# Patient Record
Sex: MALE | Race: WHITE | Employment: OTHER | URBAN - METROPOLITAN AREA
[De-identification: names, ages, dates, MRNs, and addresses within clinical notes are randomized per-mention and may not be internally consistent; named-entity substitution may affect disease eponyms.]

---

## 2017-04-19 PROBLEM — M19.011 GLENOHUMERAL ARTHRITIS, RIGHT: Status: ACTIVE | Noted: 2017-04-19

## 2022-12-05 ENCOUNTER — EXTERNAL FACILITY (OUTPATIENT)
Dept: INTERNAL MEDICINE CLINIC | Facility: CLINIC | Age: 87
End: 2022-12-05

## 2022-12-05 DIAGNOSIS — I10 HYPERTENSION, UNSPECIFIED TYPE: ICD-10-CM

## 2022-12-05 DIAGNOSIS — I35.0 AORTIC VALVE STENOSIS, ETIOLOGY OF CARDIAC VALVE DISEASE UNSPECIFIED: ICD-10-CM

## 2022-12-05 DIAGNOSIS — R53.1 WEAKNESS: Primary | ICD-10-CM

## 2022-12-05 DIAGNOSIS — R26.81 GAIT INSTABILITY: ICD-10-CM

## 2022-12-05 DIAGNOSIS — R62.7 FAILURE TO THRIVE IN ADULT: ICD-10-CM

## 2022-12-05 DIAGNOSIS — K51.919 ULCERATIVE COLITIS WITH COMPLICATION, UNSPECIFIED LOCATION (HCC): ICD-10-CM

## 2022-12-05 DIAGNOSIS — Z86.73 HISTORY OF TIA (TRANSIENT ISCHEMIC ATTACK): ICD-10-CM

## 2022-12-06 ENCOUNTER — EXTERNAL FACILITY (OUTPATIENT)
Dept: INTERNAL MEDICINE CLINIC | Facility: CLINIC | Age: 87
End: 2022-12-06

## 2022-12-06 DIAGNOSIS — Z86.73 HISTORY OF TIA (TRANSIENT ISCHEMIC ATTACK): ICD-10-CM

## 2022-12-06 DIAGNOSIS — R53.1 WEAKNESS: Primary | ICD-10-CM

## 2022-12-06 DIAGNOSIS — I35.0 AORTIC VALVE STENOSIS, ETIOLOGY OF CARDIAC VALVE DISEASE UNSPECIFIED: ICD-10-CM

## 2022-12-06 DIAGNOSIS — K51.919 ULCERATIVE COLITIS WITH COMPLICATION, UNSPECIFIED LOCATION (HCC): ICD-10-CM

## 2022-12-06 DIAGNOSIS — R62.7 FAILURE TO THRIVE IN ADULT: ICD-10-CM

## 2022-12-06 PROCEDURE — 99309 SBSQ NF CARE MODERATE MDM 30: CPT | Performed by: INTERNAL MEDICINE

## 2022-12-08 ENCOUNTER — EXTERNAL FACILITY (OUTPATIENT)
Dept: INTERNAL MEDICINE CLINIC | Facility: CLINIC | Age: 87
End: 2022-12-08

## 2022-12-08 DIAGNOSIS — Z86.73 HISTORY OF TIA (TRANSIENT ISCHEMIC ATTACK): ICD-10-CM

## 2022-12-08 DIAGNOSIS — I35.0 AORTIC VALVE STENOSIS, ETIOLOGY OF CARDIAC VALVE DISEASE UNSPECIFIED: ICD-10-CM

## 2022-12-08 DIAGNOSIS — R62.7 FAILURE TO THRIVE IN ADULT: ICD-10-CM

## 2022-12-08 DIAGNOSIS — R53.1 WEAKNESS: Primary | ICD-10-CM

## 2022-12-08 DIAGNOSIS — K51.919 ULCERATIVE COLITIS WITH COMPLICATION, UNSPECIFIED LOCATION (HCC): ICD-10-CM

## 2022-12-09 NOTE — PROGRESS NOTES
[facility] care note transcribed by Dr. Cullen Ramires    Date seen: 12/6/2022    Subjective: Patient seen and examined for gait instability osteoarthritis weakness, colitis. Patient seems to be doing well, he has been working with physical therapy, seems to be improving, lab work reviewed. Patient seems to be moving his bowels. Getting along well with staff participating with the physical therapist. He is tolerating medications. Patient and has asked if I have communicated with his daughter, she is following closely to his healthcare. However she [place] of [place]. PHYSICAL EXAMINATION: Vital signs: See chart  Gen. exam: Alert and awake, mental status at baseline, in no acute distress  HEENT: Pupils equal and reactive to light and accommodation, moist mucous membranes  Neck exam: Supple with baseline range of motion.  Normal thyroid trachea midline, no JVD  Heart exam: Regular rate and rhythm no murmurs no S3 no S4  Lung exam: No rales no rhonchi no wheezes  Abdominal exam: Soft nontender, nondistended positive bowel sounds are normoactive  Extremities exam: no clubbing no cyanosis no edema  Skin exam: see wound notes for details, no rashes  Neurological exam: Cranial nerves II through XII intact, no gross deficits  Musculoskeletal exam: Moderate bilateral generalized hand arthritis appreciated, no obvious deformity    Labs/imaging: See chart    DVT prophylaxis: Early ambulation, anticoagulation contraindicated with fall precautions    Ambulatory status: Per hospital discharge recommendations, specialist involvement/recommendations and physical therapy evaluation    Assessment and plan: We have a 80year-old male with history of Aortic Stenosis s/p TAVR (2015), TIA, Ulcerative Colitis, HLD, OA Knee whom presents with weakness, failure to thrive at home  Weakness: Stable continue current monitoring and management, physical therapy, occupational therapy, physiatry to evaluate and treat, further orders pending clinical course  Failure to Thrive: Stable continue current monitoring and management  Aortic Stenosis s/p TAVR: Stable continue current monitoring and management  Hx of TIA: Stable continue current monitoring and management  Ulcerative Colitis: Stable continue current monitoring and management    Stable problem list:  HTN: Stable continue current monitoring and management  Gait instability: Anticoagulation contraindicated, okay to continue Plavix    CODE STATUS: Full

## 2022-12-09 NOTE — PROGRESS NOTES
History and physical    [facility] care note transcribed by Dr. Enrique Mendoza date: 12/4/2022  Date seen: 12/5/2022    Chief Complaint: Failure to thrive/weakness    HPI: Patient is a 77-year-old male with history of aortic stenosis, hyperlipidemia, advanced age, who has spent some recent stays in rehab. Patient was readmitted for weakness, decline. Patient underwent work-up, treatment, stabilized, improved, now was transitioned here to [facility] in stable condition for subacute rehab. Patient seen and examined, memory seems to be intact, advanced age, medications reviewed and continued as recommended by the discharging hospital physician. Patient is in agreement with the plan of care, seems to have well-controlled pain, claims his energy levels are still poor. PMH/PSH  Patient Active Problem List  Diagnosis  Aortic stenosis  Hyperlipidemia  Preventative health care  BPH (benign prostatic hyperplasia)  Ulcerative colitis  S/P hip replacement  Lichen sclerosus  DJD (degenerative joint disease) of knee  Coronary atherosclerosis of native coronary artery  S/P AVR (aortic valve replacement) 3/3/15  Hemispheric carotid artery syndrome  TIA (transient ischemic attack)  Schatzki's ring  Dysphagia    Last recorded Outpatient Medications  Medication  acetaminophen 650 MG Tab  betamethasone valerate (VALISONE) 0.1 % ointment  carboxymethylcellulose, REFRESH, 0.5 % OPHT SOLN  Cholecalciferol (VITAMIN D3) 2000 UNIT Cap  clopidogrel (PLAVIX) 75 MG tablet  Docusate Sodium (COLACE PO)  finasteride 5 MG PO tablet  hydrOXYzine pamoate (VISTARIL) 25 MG capsule  lisinopril (PRINIVIL, ZESTRIL) 5 MG tablet  magnesium oxide 400 MG tablet  mesalamine (DELZICOL) 400 MG cap  Multiple Vitamins-Minerals (EYE VITAMINS PO)  Omeprazole 20 MG Tablet Delayed Response  simvastatin (ZOCOR) 40 MG tablet  terazosin (HYTRIN) 5 MG capsule    Allergies and drug reactions:   Allergies  Allergen Reactions  Tegaderm Chg Dressing [Chlorhexidine] Rash    SH:  Social History  Socioeconomic History  Marital status:   Spouse name: Not on file  Number of children: Not on file  Years of education: Not on file  Highest education level: Not on file  Occupational History  Not on file  Tobacco Use  Smoking status: Former Smoker  Packs/day: 2.00  Years: 40.00  Pack years: 80.00  Types: Cigarettes  Quit date: 3/13/1988  Years since quittin.6  Smokeless tobacco: Never Used  Substance and Sexual Activity  Alcohol use: No  Drug use: No  Sexual activity: Not on file  Other Topics Concern  Not on file  Social History Narrative  Not on file    FH:  Family History  Problem Relation Age of Onset  No Known Problems Mother  No Known Problems Father    Current medications: See chart    REVIEW OF SYSTEMS:  Constitutional: Negative for Chills, fatigue, fever, malaise, weight gain and weight loss. ENMT: Negative for Nasal drainage and sinus pressure. Eyes: Negative for Vision changes. Respiratory: Negative for Cough, dyspnea and wheezing. Cardio: Negative Chest pain and irregular heartbeat/palpitations. GI: Negative for Abdominal pain, constipation, diarrhea, heartburn, nausea and vomiting. : Negative for Dysuria and urinary frequency. Endocrine: Negative for Cold intolerance and heat intolerance. Neuro: Negative for Gait disturbance and memory impairment. Psych: Negative for Anxiety and depression. Integumentary: Negative for Hives and rash. MS: Negative muscle weakness. pos for joint pain  Hema/Lymph: Negative Easy bleeding and easy bruising. Allergic/Immuno: Negative Environmental allergies and food allergies. PHYSICAL EXAMINATION: Vital signs: See chart  Gen. exam: Alert and awake, mental status at baseline, in no acute distress  HEENT: Pupils equal and reactive to light and accommodation, moist mucous membranes  Neck exam: Supple with baseline range of motion.  Normal thyroid trachea midline, no JVD  Heart exam: Regular rate and rhythm no murmurs no S3 no S4  Lung exam: No rales no rhonchi no wheezes  Abdominal exam: Soft nontender, nondistended positive bowel sounds are normoactive  Extremities exam: no clubbing no cyanosis no edema  Skin exam: see wound notes for details, no rashes  Neurological exam: Cranial nerves II through XII intact, no gross deficits  Musculoskeletal exam: Moderate bilateral generalized hand arthritis appreciated, no obvious deformity    Labs/imaging: See chart    DVT prophylaxis: Early ambulation, anticoagulation contraindicated with fall precautions    Ambulatory status: Per hospital discharge recommendations, specialist involvement/recommendations and physical therapy evaluation    Assessment and plan: We have a 80year-old male with history of Aortic Stenosis s/p TAVR (2015), TIA, Ulcerative Colitis, HLD, OA Knee whom presents with weakness, failure to thrive at home  Weakness: Stable continue current monitoring and management, physical therapy, occupational therapy, physiatry to evaluate and treat, further orders pending clinical course  Failure to Thrive: Stable continue current monitoring and management  Aortic Stenosis s/p TAVR: Stable continue current monitoring and management  Hx of TIA: Stable continue current monitoring and management  Ulcerative Colitis: Stable continue current monitoring and management  HTN: Stable continue current monitoring and management  Gait instability: Anticoagulation contraindicated, okay to continue Plavix    CODE STATUS: Full  admit condition: Stable

## 2022-12-12 ENCOUNTER — EXTERNAL FACILITY (OUTPATIENT)
Dept: INTERNAL MEDICINE CLINIC | Facility: CLINIC | Age: 87
End: 2022-12-12

## 2022-12-12 DIAGNOSIS — R62.7 FAILURE TO THRIVE IN ADULT: ICD-10-CM

## 2022-12-12 DIAGNOSIS — J44.1 COPD EXACERBATION (HCC): ICD-10-CM

## 2022-12-12 DIAGNOSIS — K51.919 ULCERATIVE COLITIS WITH COMPLICATION, UNSPECIFIED LOCATION (HCC): ICD-10-CM

## 2022-12-12 DIAGNOSIS — I35.0 AORTIC VALVE STENOSIS, ETIOLOGY OF CARDIAC VALVE DISEASE UNSPECIFIED: ICD-10-CM

## 2022-12-12 DIAGNOSIS — Z86.73 HISTORY OF TIA (TRANSIENT ISCHEMIC ATTACK): ICD-10-CM

## 2022-12-12 DIAGNOSIS — R53.1 WEAKNESS: Primary | ICD-10-CM

## 2022-12-15 ENCOUNTER — EXTERNAL FACILITY (OUTPATIENT)
Dept: INTERNAL MEDICINE CLINIC | Facility: CLINIC | Age: 87
End: 2022-12-15

## 2022-12-15 DIAGNOSIS — R53.1 WEAKNESS: Primary | ICD-10-CM

## 2022-12-15 DIAGNOSIS — K51.919 ULCERATIVE COLITIS WITH COMPLICATION, UNSPECIFIED LOCATION (HCC): ICD-10-CM

## 2022-12-15 DIAGNOSIS — R62.7 FAILURE TO THRIVE IN ADULT: ICD-10-CM

## 2022-12-15 DIAGNOSIS — I35.0 AORTIC VALVE STENOSIS, ETIOLOGY OF CARDIAC VALVE DISEASE UNSPECIFIED: ICD-10-CM

## 2022-12-15 DIAGNOSIS — J44.1 COPD EXACERBATION (HCC): ICD-10-CM

## 2022-12-19 ENCOUNTER — EXTERNAL FACILITY (OUTPATIENT)
Dept: INTERNAL MEDICINE CLINIC | Facility: CLINIC | Age: 87
End: 2022-12-19

## 2022-12-19 DIAGNOSIS — J44.1 COPD EXACERBATION (HCC): ICD-10-CM

## 2022-12-19 DIAGNOSIS — R53.1 WEAKNESS: Primary | ICD-10-CM

## 2022-12-19 DIAGNOSIS — K51.919 ULCERATIVE COLITIS WITH COMPLICATION, UNSPECIFIED LOCATION (HCC): ICD-10-CM

## 2022-12-19 DIAGNOSIS — I35.0 AORTIC VALVE STENOSIS, ETIOLOGY OF CARDIAC VALVE DISEASE UNSPECIFIED: ICD-10-CM

## 2022-12-19 DIAGNOSIS — R62.7 FAILURE TO THRIVE IN ADULT: ICD-10-CM

## 2022-12-22 ENCOUNTER — EXTERNAL FACILITY (OUTPATIENT)
Dept: INTERNAL MEDICINE CLINIC | Facility: CLINIC | Age: 87
End: 2022-12-22

## 2022-12-22 DIAGNOSIS — J44.1 COPD EXACERBATION (HCC): ICD-10-CM

## 2022-12-22 DIAGNOSIS — R62.7 FAILURE TO THRIVE IN ADULT: ICD-10-CM

## 2022-12-22 DIAGNOSIS — R53.1 WEAKNESS: Primary | ICD-10-CM

## 2022-12-22 DIAGNOSIS — K51.919 ULCERATIVE COLITIS WITH COMPLICATION, UNSPECIFIED LOCATION (HCC): ICD-10-CM

## 2022-12-22 DIAGNOSIS — I35.0 AORTIC VALVE STENOSIS, ETIOLOGY OF CARDIAC VALVE DISEASE UNSPECIFIED: ICD-10-CM

## 2022-12-23 NOTE — PROGRESS NOTES
[facility] care note transcribed by Dr. Vinnie Lr    Date seen: 12/22/2022    Subjective: Patient seen and examined for gait instability osteoarthritis weakness, colitis, constipation, COPD exacerbation. Patient seems stable, continues to be bowel focused, this consumes most of our conversation, but he is congestion and cough have resolved, he is working with physical therapy, is making some discharge plans. Pleasant as usual. Wishes me uneventful holiday weekend. His hair has been cut, he looks well groomed and cared for. PHYSICAL EXAMINATION: Vital signs: See chart  Gen. exam: Alert and awake, mental status at baseline, in no acute distress  HEENT: Pupils equal and reactive to light and accommodation, moist mucous membranes  Neck exam: Supple with baseline range of motion.  Normal thyroid trachea midline, no JVD  Heart exam: Regular rate and rhythm no murmurs no S3 no S4  Lung exam: No rales no rhonchi no wheezes, resolved coarse cough  Abdominal exam: Soft nontender, nondistended positive bowel sounds are normoactive  Extremities exam: no clubbing no cyanosis no edema  Skin exam: see wound notes for details, no rashes  Neurological exam: Cranial nerves II through XII intact, no gross deficits  Musculoskeletal exam: Moderate bilateral generalized hand arthritis appreciated, no obvious deformity    Labs/imaging: See chart    DVT prophylaxis: Early ambulation, anticoagulation contraindicated with fall precautions    Ambulatory status: Per hospital discharge recommendations, specialist involvement/recommendations and physical therapy evaluation    Assessment and plan: We have a 80year-old male with history of Aortic Stenosis s/p TAVR (2015), TIA, Ulcerative Colitis, HLD, OA Knee whom presents with weakness, failure to thrive at home  Weakness: Stable continue current monitoring and management, physical therapy, occupational therapy, physiatry to evaluate and treat, further orders pending clinical course, discharge planning  Failure to Thrive: Stable continue current monitoring and management  Aortic Stenosis s/p TAVR: Stable continue current monitoring and management  Ulcerative Colitis: Twice daily MiraLAX, and stool softeners for now of Colace, continue current ulcerative colitis medication as well  COPD exacerbation: Duo nebs can be now changed to as needed basis 3 times daily, pulmonology following, serial chest x-rays can stop, pulmonology may sign off    Stable problem list:  Hx of TIA: Stable continue current monitoring and management  HTN: Stable continue current monitoring and management  Gait instability: Anticoagulation contraindicated, okay to continue Plavix    CODE STATUS: Full

## 2022-12-23 NOTE — PROGRESS NOTES
[facility] care note transcribed by Dr. Sadie Thompson    Date seen: 12/19/2022    Subjective: Patient seen and examined for gait instability osteoarthritis weakness, colitis, constipation, COPD exacerbation. Patient seems stable, doing quite well, his cough and congestion have completely resolved, pulmonology is following, and is pleased with his course, he continues to improve with physical therapy, bowels have still been constipated and loose at times. More constipated than anything, he still has complaints of this. PHYSICAL EXAMINATION: Vital signs: See chart  Gen. exam: Alert and awake, mental status at baseline, in no acute distress  HEENT: Pupils equal and reactive to light and accommodation, moist mucous membranes  Neck exam: Supple with baseline range of motion.  Normal thyroid trachea midline, no JVD  Heart exam: Regular rate and rhythm no murmurs no S3 no S4  Lung exam: No rales no rhonchi no wheezes, coarse cough  Abdominal exam: Soft nontender, nondistended positive bowel sounds are normoactive  Extremities exam: no clubbing no cyanosis no edema  Skin exam: see wound notes for details, no rashes  Neurological exam: Cranial nerves II through XII intact, no gross deficits  Musculoskeletal exam: Moderate bilateral generalized hand arthritis appreciated, no obvious deformity    Labs/imaging: See chart    DVT prophylaxis: Early ambulation, anticoagulation contraindicated with fall precautions    Ambulatory status: Per hospital discharge recommendations, specialist involvement/recommendations and physical therapy evaluation    Assessment and plan: We have a 80year-old male with history of Aortic Stenosis s/p TAVR (2015), TIA, Ulcerative Colitis, HLD, OA Knee whom presents with weakness, failure to thrive at home  Weakness: Stable continue current monitoring and management, physical therapy, occupational therapy, physiatry to evaluate and treat, further orders pending clinical course  Failure to Thrive: Stable continue current monitoring and management  Aortic Stenosis s/p TAVR: Stable continue current monitoring and management  Ulcerative Colitis: Twice daily MiraLAX, and stool softeners for now of Colace, continue current ulcerative colitis medication as well  COPD exacerbation: Duo nebs can be now changed to as needed basis 3 times daily, pulmonology following, serial chest x-rays can stop    Stable problem list:  Hx of TIA: Stable continue current monitoring and management  HTN: Stable continue current monitoring and management  Gait instability: Anticoagulation contraindicated, okay to continue Plavix    CODE STATUS: Full

## 2022-12-23 NOTE — PROGRESS NOTES
[facility] care note transcribed by Dr. Toy Morales    Date seen: 12/15/2022    Subjective: Patient seen and examined for gait instability osteoarthritis weakness, colitis, constipation. Patient seen examined, stable and doing well, coarse cough and breathing sound better, pulmonology following. Patient is working with physical therapy, seems to be getting stronger, still seems to be constipated. His history of colitis is treated. PHYSICAL EXAMINATION: Vital signs: See chart  Gen. exam: Alert and awake, mental status at baseline, in no acute distress  HEENT: Pupils equal and reactive to light and accommodation, moist mucous membranes  Neck exam: Supple with baseline range of motion.  Normal thyroid trachea midline, no JVD  Heart exam: Regular rate and rhythm no murmurs no S3 no S4  Lung exam: No rales no rhonchi no wheezes, coarse cough  Abdominal exam: Soft nontender, nondistended positive bowel sounds are normoactive  Extremities exam: no clubbing no cyanosis no edema  Skin exam: see wound notes for details, no rashes  Neurological exam: Cranial nerves II through XII intact, no gross deficits  Musculoskeletal exam: Moderate bilateral generalized hand arthritis appreciated, no obvious deformity    Labs/imaging: See chart    DVT prophylaxis: Early ambulation, anticoagulation contraindicated with fall precautions    Ambulatory status: Per hospital discharge recommendations, specialist involvement/recommendations and physical therapy evaluation    Assessment and plan: We have a 80year-old male with history of Aortic Stenosis s/p TAVR (2015), TIA, Ulcerative Colitis, HLD, OA Knee whom presents with weakness, failure to thrive at home  Weakness: Stable continue current monitoring and management, physical therapy, occupational therapy, physiatry to evaluate and treat, further orders pending clinical course  Failure to Thrive: Stable continue current monitoring and management  Aortic Stenosis s/p TAVR: Stable continue current monitoring and management  Ulcerative Colitis: Twice daily MiraLAX, and stool softeners for now of Colace, continue current ulcerative colitis medication as well  COPD exacerbation: Continue with duo nebs, other treatments per pulmonology, serial chest x-ray    Stable problem list:  Hx of TIA: Stable continue current monitoring and management  HTN: Stable continue current monitoring and management  Gait instability: Anticoagulation contraindicated, okay to continue Plavix    CODE STATUS: Full

## 2022-12-23 NOTE — PROGRESS NOTES
[facility] the [facility] care note transcribed by Dr. Jordi Grove    Date seen: 12/12/2022    Subjective: Patient seen and examined for gait instability osteoarthritis weakness, colitis, constipation. Patient seems stable, doing well, but still having trouble with constipation, he is asking about going up on the stool softeners. Also seems to have a coarse congested type cough, that is recently started, we have involved the pulmonologist, he does have a history of COPD. PHYSICAL EXAMINATION: Vital signs: See chart  Gen. exam: Alert and awake, mental status at baseline, in no acute distress  HEENT: Pupils equal and reactive to light and accommodation, moist mucous membranes  Neck exam: Supple with baseline range of motion.  Normal thyroid trachea midline, no JVD  Heart exam: Regular rate and rhythm no murmurs no S3 no S4  Lung exam: No rales no rhonchi no wheezes, coarse cough  Abdominal exam: Soft nontender, nondistended positive bowel sounds are normoactive  Extremities exam: no clubbing no cyanosis no edema  Skin exam: see wound notes for details, no rashes  Neurological exam: Cranial nerves II through XII intact, no gross deficits  Musculoskeletal exam: Moderate bilateral generalized hand arthritis appreciated, no obvious deformity    Labs/imaging: See chart    DVT prophylaxis: Early ambulation, anticoagulation contraindicated with fall precautions    Ambulatory status: Per hospital discharge recommendations, specialist involvement/recommendations and physical therapy evaluation    Assessment and plan: We have a 80year-old male with history of Aortic Stenosis s/p TAVR (2015), TIA, Ulcerative Colitis, HLD, OA Knee whom presents with weakness, failure to thrive at home  Weakness: Stable continue current monitoring and management, physical therapy, occupational therapy, physiatry to evaluate and treat, further orders pending clinical course  Failure to Thrive: Stable continue current monitoring and management  Aortic Stenosis s/p TAVR: Stable continue current monitoring and management  Ulcerative Colitis: bowels have been up-and-down, we will add on MiraLAX as a softener on account of his constipation  COPD exacerbation: With congestion, will start duo nebs 3 times daily for 7 to 10 days, and ask pulmonology to see if    Stable problem list:  Hx of TIA: Stable continue current monitoring and management  HTN: Stable continue current monitoring and management  Gait instability: Anticoagulation contraindicated, okay to continue Plavix    CODE STATUS: Full

## 2022-12-23 NOTE — PROGRESS NOTES
[facility] note transcribed by Dr. Buck Ward    Date seen: 12/8/2022    Subjective: Patient seen and examined for gait instability osteoarthritis weakness, colitis. Patient seems to be doing well, patient seems stable, working physical therapy, but does still have some complaints about his bowels, his colitis does seem to be bothering him, and he seems to be constipated on most days, he is using assistance to get to the bathroom, but is doing quite well. PHYSICAL EXAMINATION: Vital signs: See chart  Gen. exam: Alert and awake, mental status at baseline, in no acute distress  HEENT: Pupils equal and reactive to light and accommodation, moist mucous membranes  Neck exam: Supple with baseline range of motion.  Normal thyroid trachea midline, no JVD  Heart exam: Regular rate and rhythm no murmurs no S3 no S4  Lung exam: No rales no rhonchi no wheezes  Abdominal exam: Soft nontender, nondistended positive bowel sounds are normoactive  Extremities exam: no clubbing no cyanosis no edema  Skin exam: see wound notes for details, no rashes  Neurological exam: Cranial nerves II through XII intact, no gross deficits  Musculoskeletal exam: Moderate bilateral generalized hand arthritis appreciated, no obvious deformity    Labs/imaging: See chart    DVT prophylaxis: Early ambulation, anticoagulation contraindicated with fall precautions    Ambulatory status: Per hospital discharge recommendations, specialist involvement/recommendations and physical therapy evaluation    Assessment and plan: We have a 80year-old male with history of Aortic Stenosis s/p TAVR (2015), TIA, Ulcerative Colitis, HLD, OA Knee whom presents with weakness, failure to thrive at home  Weakness: Stable continue current monitoring and management, physical therapy, occupational therapy, physiatry to evaluate and treat, further orders pending clinical course  Failure to Thrive: Stable continue current monitoring and management  Aortic Stenosis s/p TAVR: Stable continue current monitoring and management  Hx of TIA: Stable continue current monitoring and management  Ulcerative Colitis: Able, bowels have been up-and-down, we will add on MiraLAX as a softener on account of his constipation    Stable problem list:  HTN: Stable continue current monitoring and management  Gait instability: Anticoagulation contraindicated, okay to continue Plavix    CODE STATUS: Full

## 2022-12-29 ENCOUNTER — EXTERNAL FACILITY (OUTPATIENT)
Dept: INTERNAL MEDICINE CLINIC | Facility: CLINIC | Age: 87
End: 2022-12-29

## 2022-12-29 DIAGNOSIS — J44.1 COPD EXACERBATION (HCC): ICD-10-CM

## 2022-12-29 DIAGNOSIS — R53.1 WEAKNESS: Primary | ICD-10-CM

## 2022-12-29 DIAGNOSIS — K51.919 ULCERATIVE COLITIS WITH COMPLICATION, UNSPECIFIED LOCATION (HCC): ICD-10-CM

## 2022-12-29 DIAGNOSIS — R62.7 FAILURE TO THRIVE IN ADULT: ICD-10-CM

## 2022-12-29 DIAGNOSIS — I35.0 AORTIC VALVE STENOSIS, ETIOLOGY OF CARDIAC VALVE DISEASE UNSPECIFIED: ICD-10-CM

## 2022-12-29 PROCEDURE — 99309 SBSQ NF CARE MODERATE MDM 30: CPT | Performed by: INTERNAL MEDICINE

## 2023-02-09 NOTE — PROGRESS NOTES
[facility] care note transcribed by Dr. Daniel Lock    Date seen: 12/29/2022    Subjective: Patient seen and examined for gait instability osteoarthritis weakness, colitis, constipation, COPD exacerbation. Patient seen examined, stable and doing well, he is planning on discharging home in the near future, but he does not know a date. He does have good family support but advanced age, medications have been stable, breathing has been very stable. Bowels have been very stable, lab work reviewed uneventful Christmas holiday. PHYSICAL EXAMINATION: Vital signs: See chart  Gen. exam: Alert and awake, mental status at baseline, in no acute distress  HEENT: Pupils equal and reactive to light and accommodation, moist mucous membranes  Neck exam: Supple with baseline range of motion.  Normal thyroid trachea midline, no JVD  Heart exam: Regular rate and rhythm no murmurs no S3 no S4  Lung exam: No rales no rhonchi no wheezes, resolved coarse cough  Abdominal exam: Soft nontender, nondistended positive bowel sounds are normoactive  Extremities exam: no clubbing no cyanosis no edema  Skin exam: see wound notes for details, no rashes  Neurological exam: Cranial nerves II through XII intact, no gross deficits  Musculoskeletal exam: Moderate bilateral generalized hand arthritis appreciated, no obvious deformity    Labs/imaging: See chart    DVT prophylaxis: Early ambulation, anticoagulation contraindicated with fall precautions    Ambulatory status: Per hospital discharge recommendations, specialist involvement/recommendations and physical therapy evaluation    Assessment and plan: We have a 80year-old male with history of Aortic Stenosis s/p TAVR (2015), TIA, Ulcerative Colitis, HLD, OA Knee whom presents with weakness, failure to thrive at home  Weakness: Stable continue current monitoring and management, physical therapy, occupational therapy, physiatry to evaluate and treat, further orders pending clinical course, discharge planning for the near future  Failure to Thrive: Stable continue current monitoring and management  Aortic Stenosis s/p TAVR: Stable continue current monitoring and management  Ulcerative Colitis: Twice daily MiraLAX, and stool softeners for now of Colace, continue current ulcerative colitis medication as well  COPD exacerbation: Duo nebs can be now changed to as needed basis 3 times daily, pulmonology following, serial chest x-rays can stop, pulmonology may sign off    Stable problem list:  Hx of TIA: Stable continue current monitoring and management  HTN: Stable continue current monitoring and management  Gait instability: Anticoagulation contraindicated, okay to continue Plavix    CODE STATUS: Full

## 2023-02-16 ENCOUNTER — HOSPITAL ENCOUNTER (INPATIENT)
Facility: HOSPITAL | Age: 88
LOS: 3 days | Discharge: NURSING, REHAB OR LONG-TERM CARE | DRG: 690 | End: 2023-02-20
Attending: EMERGENCY MEDICINE | Admitting: INTERNAL MEDICINE
Payer: MEDICARE

## 2023-02-16 ENCOUNTER — HOSPITAL ENCOUNTER (INPATIENT)
Facility: HOSPITAL | Age: 88
LOS: 3 days | Discharge: NURSING, REHAB OR LONG-TERM CARE | End: 2023-02-20
Attending: EMERGENCY MEDICINE | Admitting: INTERNAL MEDICINE
Payer: MEDICARE

## 2023-02-16 DIAGNOSIS — R11.2 NAUSEA AND VOMITING, UNSPECIFIED VOMITING TYPE: ICD-10-CM

## 2023-02-16 DIAGNOSIS — N39.0 URINARY TRACT INFECTION WITHOUT HEMATURIA, SITE UNSPECIFIED: Primary | ICD-10-CM

## 2023-02-16 LAB
ANION GAP SERPL CALC-SCNC: 6 MMOL/L (ref 0–18)
BASOPHILS # BLD AUTO: 0.01 X10(3) UL (ref 0–0.2)
BASOPHILS NFR BLD AUTO: 0.1 %
BILIRUB UR QL: NEGATIVE
BUN BLD-MCNC: 15 MG/DL (ref 7–18)
BUN/CREAT SERPL: 16.5 (ref 10–20)
CALCIUM BLD-MCNC: 9 MG/DL (ref 8.5–10.1)
CHLORIDE SERPL-SCNC: 109 MMOL/L (ref 98–112)
CLARITY UR: CLEAR
CO2 SERPL-SCNC: 24 MMOL/L (ref 21–32)
COLOR UR: YELLOW
CREAT BLD-MCNC: 0.91 MG/DL
DEPRECATED RDW RBC AUTO: 49.6 FL (ref 35.1–46.3)
EOSINOPHIL # BLD AUTO: 0.07 X10(3) UL (ref 0–0.7)
EOSINOPHIL NFR BLD AUTO: 0.6 %
ERYTHROCYTE [DISTWIDTH] IN BLOOD BY AUTOMATED COUNT: 14.9 % (ref 11–15)
GFR SERPLBLD BASED ON 1.73 SQ M-ARVRAT: 78 ML/MIN/1.73M2 (ref 60–?)
GLUCOSE BLD-MCNC: 134 MG/DL (ref 70–99)
GLUCOSE UR-MCNC: NEGATIVE MG/DL
HCT VFR BLD AUTO: 42.7 %
HGB BLD-MCNC: 13.3 G/DL
HGB UR QL STRIP.AUTO: NEGATIVE
HYALINE CASTS #/AREA URNS AUTO: PRESENT /LPF
IMM GRANULOCYTES # BLD AUTO: 0.06 X10(3) UL (ref 0–1)
IMM GRANULOCYTES NFR BLD: 0.5 %
LYMPHOCYTES # BLD AUTO: 0.41 X10(3) UL (ref 1–4)
LYMPHOCYTES NFR BLD AUTO: 3.3 %
MCH RBC QN AUTO: 28.2 PG (ref 26–34)
MCHC RBC AUTO-ENTMCNC: 31.1 G/DL (ref 31–37)
MCV RBC AUTO: 90.7 FL
MONOCYTES # BLD AUTO: 0.56 X10(3) UL (ref 0.1–1)
MONOCYTES NFR BLD AUTO: 4.5 %
NEUTROPHILS # BLD AUTO: 11.42 X10 (3) UL (ref 1.5–7.7)
NEUTROPHILS # BLD AUTO: 11.42 X10(3) UL (ref 1.5–7.7)
NEUTROPHILS NFR BLD AUTO: 91 %
NITRITE UR QL STRIP.AUTO: NEGATIVE
OSMOLALITY SERPL CALC.SUM OF ELEC: 291 MOSM/KG (ref 275–295)
PH UR: 5 [PH] (ref 5–8)
PLATELET # BLD AUTO: 293 10(3)UL (ref 150–450)
POTASSIUM SERPL-SCNC: 4.3 MMOL/L (ref 3.5–5.1)
PROT UR-MCNC: 30 MG/DL
RBC # BLD AUTO: 4.71 X10(6)UL
SODIUM SERPL-SCNC: 139 MMOL/L (ref 136–145)
SP GR UR STRIP: 1.02 (ref 1–1.03)
UROBILINOGEN UR STRIP-ACNC: <2
VIT C UR-MCNC: 40 MG/DL
WBC # BLD AUTO: 12.5 X10(3) UL (ref 4–11)

## 2023-02-16 PROCEDURE — 80048 BASIC METABOLIC PNL TOTAL CA: CPT | Performed by: EMERGENCY MEDICINE

## 2023-02-16 PROCEDURE — 96361 HYDRATE IV INFUSION ADD-ON: CPT

## 2023-02-16 PROCEDURE — 85025 COMPLETE CBC W/AUTO DIFF WBC: CPT | Performed by: EMERGENCY MEDICINE

## 2023-02-16 PROCEDURE — 96375 TX/PRO/DX INJ NEW DRUG ADDON: CPT

## 2023-02-16 PROCEDURE — 87086 URINE CULTURE/COLONY COUNT: CPT | Performed by: EMERGENCY MEDICINE

## 2023-02-16 PROCEDURE — 81001 URINALYSIS AUTO W/SCOPE: CPT | Performed by: EMERGENCY MEDICINE

## 2023-02-16 PROCEDURE — 99285 EMERGENCY DEPT VISIT HI MDM: CPT

## 2023-02-16 RX ORDER — ONDANSETRON 2 MG/ML
4 INJECTION INTRAMUSCULAR; INTRAVENOUS ONCE
Status: COMPLETED | OUTPATIENT
Start: 2023-02-16 | End: 2023-02-16

## 2023-02-16 RX ORDER — MORPHINE SULFATE 4 MG/ML
4 INJECTION, SOLUTION INTRAMUSCULAR; INTRAVENOUS ONCE
Status: COMPLETED | OUTPATIENT
Start: 2023-02-16 | End: 2023-02-16

## 2023-02-17 PROBLEM — N39.0 URINARY TRACT INFECTION WITHOUT HEMATURIA, SITE UNSPECIFIED: Status: ACTIVE | Noted: 2023-02-17

## 2023-02-17 PROBLEM — R11.2 NAUSEA AND VOMITING, UNSPECIFIED VOMITING TYPE: Status: ACTIVE | Noted: 2023-02-17

## 2023-02-17 LAB — SARS-COV-2 RNA RESP QL NAA+PROBE: NOT DETECTED

## 2023-02-17 PROCEDURE — 97535 SELF CARE MNGMENT TRAINING: CPT

## 2023-02-17 PROCEDURE — 96376 TX/PRO/DX INJ SAME DRUG ADON: CPT

## 2023-02-17 PROCEDURE — 97530 THERAPEUTIC ACTIVITIES: CPT

## 2023-02-17 PROCEDURE — 97166 OT EVAL MOD COMPLEX 45 MIN: CPT

## 2023-02-17 PROCEDURE — 80048 BASIC METABOLIC PNL TOTAL CA: CPT | Performed by: STUDENT IN AN ORGANIZED HEALTH CARE EDUCATION/TRAINING PROGRAM

## 2023-02-17 PROCEDURE — 96365 THER/PROPH/DIAG IV INF INIT: CPT

## 2023-02-17 PROCEDURE — 92610 EVALUATE SWALLOWING FUNCTION: CPT

## 2023-02-17 PROCEDURE — 85025 COMPLETE CBC W/AUTO DIFF WBC: CPT | Performed by: STUDENT IN AN ORGANIZED HEALTH CARE EDUCATION/TRAINING PROGRAM

## 2023-02-17 RX ORDER — IPRATROPIUM BROMIDE AND ALBUTEROL SULFATE 2.5; .5 MG/3ML; MG/3ML
3 SOLUTION RESPIRATORY (INHALATION) 2 TIMES DAILY PRN
COMMUNITY

## 2023-02-17 RX ORDER — BISACODYL 10 MG
10 SUPPOSITORY, RECTAL RECTAL
Status: DISCONTINUED | OUTPATIENT
Start: 2023-02-17 | End: 2023-02-20

## 2023-02-17 RX ORDER — MESALAMINE 400 MG/1
1200 CAPSULE, DELAYED RELEASE ORAL 4 TIMES DAILY
Status: DISCONTINUED | OUTPATIENT
Start: 2023-02-17 | End: 2023-02-20

## 2023-02-17 RX ORDER — ACETAMINOPHEN 500 MG
500 TABLET ORAL EVERY 4 HOURS PRN
Status: DISCONTINUED | OUTPATIENT
Start: 2023-02-17 | End: 2023-02-20

## 2023-02-17 RX ORDER — PIMECROLIMUS 10 MG/G
CREAM TOPICAL 2 TIMES DAILY
COMMUNITY

## 2023-02-17 RX ORDER — PANTOPRAZOLE SODIUM 40 MG/1
40 TABLET, DELAYED RELEASE ORAL
Status: DISCONTINUED | OUTPATIENT
Start: 2023-02-17 | End: 2023-02-20

## 2023-02-17 RX ORDER — MV-MIN/FA/VIT K/LUTEIN/ZEAXANT 200MCG-5MG
1 CAPSULE ORAL 2 TIMES DAILY
COMMUNITY

## 2023-02-17 RX ORDER — MELATONIN
325 DAILY
Status: DISCONTINUED | OUTPATIENT
Start: 2023-02-17 | End: 2023-02-20

## 2023-02-17 RX ORDER — DIPHENHYDRAMINE HCL 25 MG
25 CAPSULE ORAL EVERY 4 HOURS PRN
Status: DISCONTINUED | OUTPATIENT
Start: 2023-02-17 | End: 2023-02-20

## 2023-02-17 RX ORDER — METOCLOPRAMIDE HYDROCHLORIDE 5 MG/ML
10 INJECTION INTRAMUSCULAR; INTRAVENOUS EVERY 8 HOURS PRN
Status: DISCONTINUED | OUTPATIENT
Start: 2023-02-17 | End: 2023-02-20

## 2023-02-17 RX ORDER — MELATONIN
2000 DAILY
Status: DISCONTINUED | OUTPATIENT
Start: 2023-02-17 | End: 2023-02-20

## 2023-02-17 RX ORDER — TACROLIMUS 1 MG/G
OINTMENT TOPICAL 2 TIMES DAILY
Status: DISCONTINUED | OUTPATIENT
Start: 2023-02-17 | End: 2023-02-20

## 2023-02-17 RX ORDER — CLOPIDOGREL BISULFATE 75 MG/1
75 TABLET ORAL DAILY
Status: DISCONTINUED | OUTPATIENT
Start: 2023-02-17 | End: 2023-02-20

## 2023-02-17 RX ORDER — NYSTATIN 100000 U/G
CREAM TOPICAL 2 TIMES DAILY
COMMUNITY

## 2023-02-17 RX ORDER — MELATONIN
325 DAILY
COMMUNITY

## 2023-02-17 RX ORDER — POLYETHYLENE GLYCOL 3350 17 G/17G
17 POWDER, FOR SOLUTION ORAL DAILY PRN
Status: DISCONTINUED | OUTPATIENT
Start: 2023-02-17 | End: 2023-02-19

## 2023-02-17 RX ORDER — SODIUM CHLORIDE 9 MG/ML
INJECTION, SOLUTION INTRAVENOUS CONTINUOUS
Status: DISCONTINUED | OUTPATIENT
Start: 2023-02-17 | End: 2023-02-18

## 2023-02-17 RX ORDER — FINASTERIDE 5 MG/1
5 TABLET, FILM COATED ORAL DAILY
Status: DISCONTINUED | OUTPATIENT
Start: 2023-02-17 | End: 2023-02-20

## 2023-02-17 RX ORDER — ATORVASTATIN CALCIUM 20 MG/1
20 TABLET, FILM COATED ORAL NIGHTLY
Status: DISCONTINUED | OUTPATIENT
Start: 2023-02-17 | End: 2023-02-20

## 2023-02-17 RX ORDER — SODIUM CHLORIDE 9 MG/ML
INJECTION, SOLUTION INTRAVENOUS CONTINUOUS
Status: ACTIVE | OUTPATIENT
Start: 2023-02-17 | End: 2023-02-17

## 2023-02-17 RX ORDER — ASCORBIC ACID 500 MG
500 TABLET ORAL DAILY
Status: DISCONTINUED | OUTPATIENT
Start: 2023-02-17 | End: 2023-02-20

## 2023-02-17 RX ORDER — VITS A,C,E/LUTEIN/MINERALS 300MCG-200
1 TABLET ORAL 2 TIMES DAILY
Status: DISCONTINUED | OUTPATIENT
Start: 2023-02-17 | End: 2023-02-20

## 2023-02-17 RX ORDER — HEPARIN SODIUM 5000 [USP'U]/ML
5000 INJECTION, SOLUTION INTRAVENOUS; SUBCUTANEOUS EVERY 8 HOURS SCHEDULED
Status: DISCONTINUED | OUTPATIENT
Start: 2023-02-17 | End: 2023-02-20

## 2023-02-17 RX ORDER — MAGNESIUM HYDROXIDE/ALUMINUM HYDROXICE/SIMETHICONE 120; 1200; 1200 MG/30ML; MG/30ML; MG/30ML
15 SUSPENSION ORAL EVERY 6 HOURS PRN
Status: DISCONTINUED | OUTPATIENT
Start: 2023-02-17 | End: 2023-02-20

## 2023-02-17 RX ORDER — LISINOPRIL 2.5 MG/1
2.5 TABLET ORAL DAILY
Status: DISCONTINUED | OUTPATIENT
Start: 2023-02-18 | End: 2023-02-20

## 2023-02-17 RX ORDER — POLYETHYLENE GLYCOL 3350 17 G/17G
17 POWDER, FOR SOLUTION ORAL 2 TIMES DAILY
COMMUNITY

## 2023-02-17 RX ORDER — PIMECROLIMUS 10 MG/G
CREAM TOPICAL 2 TIMES DAILY
Status: DISCONTINUED | OUTPATIENT
Start: 2023-02-17 | End: 2023-02-20

## 2023-02-17 RX ORDER — SENNOSIDES 8.6 MG
17.2 TABLET ORAL NIGHTLY PRN
Status: DISCONTINUED | OUTPATIENT
Start: 2023-02-17 | End: 2023-02-19

## 2023-02-17 RX ORDER — PIMECROLIMUS 10 MG/G
CREAM TOPICAL 2 TIMES DAILY
Status: DISCONTINUED | OUTPATIENT
Start: 2023-02-17 | End: 2023-02-17

## 2023-02-17 RX ORDER — SODIUM PHOSPHATE, DIBASIC AND SODIUM PHOSPHATE, MONOBASIC 7; 19 G/133ML; G/133ML
1 ENEMA RECTAL ONCE AS NEEDED
Status: DISCONTINUED | OUTPATIENT
Start: 2023-02-17 | End: 2023-02-20

## 2023-02-17 RX ORDER — ONDANSETRON 2 MG/ML
4 INJECTION INTRAMUSCULAR; INTRAVENOUS EVERY 6 HOURS PRN
Status: DISCONTINUED | OUTPATIENT
Start: 2023-02-17 | End: 2023-02-20

## 2023-02-17 RX ORDER — BUDESONIDE 0.5 MG/2ML
0.5 INHALANT ORAL DAILY PRN
Status: DISCONTINUED | OUTPATIENT
Start: 2023-02-17 | End: 2023-02-20

## 2023-02-17 RX ORDER — DOXEPIN HYDROCHLORIDE 50 MG/1
1 CAPSULE ORAL DAILY
Status: DISCONTINUED | OUTPATIENT
Start: 2023-02-17 | End: 2023-02-20

## 2023-02-17 RX ORDER — MORPHINE SULFATE 2 MG/ML
2 INJECTION, SOLUTION INTRAMUSCULAR; INTRAVENOUS ONCE
Status: COMPLETED | OUTPATIENT
Start: 2023-02-17 | End: 2023-02-17

## 2023-02-17 RX ORDER — TACROLIMUS 1 MG/G
OINTMENT TOPICAL 2 TIMES DAILY
COMMUNITY

## 2023-02-17 RX ORDER — IPRATROPIUM BROMIDE AND ALBUTEROL SULFATE 2.5; .5 MG/3ML; MG/3ML
3 SOLUTION RESPIRATORY (INHALATION) 2 TIMES DAILY PRN
Status: DISCONTINUED | OUTPATIENT
Start: 2023-02-17 | End: 2023-02-20

## 2023-02-17 RX ORDER — DIPHENHYDRAMINE HCL 25 MG
25 CAPSULE ORAL EVERY 4 HOURS PRN
COMMUNITY

## 2023-02-17 RX ORDER — BUDESONIDE 0.5 MG/2ML
0.5 INHALANT ORAL DAILY PRN
COMMUNITY

## 2023-02-17 RX ORDER — MAGNESIUM HYDROXIDE/ALUMINUM HYDROXICE/SIMETHICONE 120; 1200; 1200 MG/30ML; MG/30ML; MG/30ML
15 SUSPENSION ORAL EVERY 6 HOURS PRN
COMMUNITY

## 2023-02-17 RX ORDER — ASCORBIC ACID 500 MG
500 TABLET ORAL DAILY
COMMUNITY

## 2023-02-17 RX ORDER — CARBOXYMETHYLCELLULOSE SODIUM 10 MG/ML
1 GEL OPHTHALMIC 4 TIMES DAILY PRN
Status: DISCONTINUED | OUTPATIENT
Start: 2023-02-17 | End: 2023-02-20

## 2023-02-17 RX ORDER — KETOCONAZOLE 20 MG/ML
1 SHAMPOO TOPICAL
COMMUNITY

## 2023-02-17 RX ORDER — TRAMADOL HYDROCHLORIDE 50 MG/1
50 TABLET ORAL 3 TIMES DAILY PRN
Status: ON HOLD | COMMUNITY
Start: 2023-01-27 | End: 2023-02-20

## 2023-02-17 RX ORDER — TERAZOSIN 5 MG/1
5 CAPSULE ORAL DAILY
Status: DISCONTINUED | OUTPATIENT
Start: 2023-02-17 | End: 2023-02-20

## 2023-02-17 RX ORDER — TRAMADOL HYDROCHLORIDE 50 MG/1
50 TABLET ORAL 3 TIMES DAILY PRN
Status: DISCONTINUED | OUTPATIENT
Start: 2023-02-17 | End: 2023-02-20

## 2023-02-17 RX ORDER — NYSTATIN 100000 U/G
CREAM TOPICAL 2 TIMES DAILY
Status: DISCONTINUED | OUTPATIENT
Start: 2023-02-17 | End: 2023-02-20

## 2023-02-17 NOTE — ED QUICK NOTES
Daughter at bedside, pt assisted to standing with assistance voided into urinal.call light within reach.

## 2023-02-17 NOTE — ED QUICK NOTES
Orders for admission, patient is aware of plan and ready to go upstairs.  Any questions, please call ED RN Suzanne at extension [phone]    Patient Covid vaccination status: Unvaccinated     COVID Test Ordered in ED: Rapid SARS-CoV-2 by PCR    COVID Suspicion at Admission: N/A    Running Infusions:      Mental Status/LOC at time of transport: A&Ox3  Other pertinent information: ambulatory with assistance x 2  CIWA score: N/A   NIH score:  N/A

## 2023-02-17 NOTE — PHYSICAL THERAPY NOTE
PT order received, chart review completed. Per RN, pt has significant hypotension and would not be appropriate for out of bed mobility at this time. Requesting f/u for PT evaluation tomorrow 2/18.

## 2023-02-17 NOTE — ED QUICK NOTES
Pt brought in via ems from assisted living with c/o multiple episodes of vomiting pt reports he had some fish today and feels that's what made him vomit, pt denies pain/discomfort/fever/dizziness/pt A&OX3 calm and cooperative non labored breathing speaks clear full sentences, pt in no distress resting on cart, pt on monitor.

## 2023-02-17 NOTE — ED QUICK NOTES
Pt positioned on his left side for comfort, lights dimmed pt going to sleep, daughter going home, no distress noted, call light within reach.

## 2023-02-17 NOTE — ED QUICK NOTES
Pt tolerated PO well, reports feels a lot better, aware of poc verbalizes understanding and is agreeable, daughter at bedside.

## 2023-02-17 NOTE — ED INITIAL ASSESSMENT (HPI)
Pt brought in via ems from [place] [address] assisted living with c/o of multiple episodes of vomiting after eating fish earlier states feels gassy

## 2023-02-17 NOTE — ED QUICK NOTES
[place] Daughter , daughter states pt will need help with ordering from a menu and as well as needs help with food being cut up to small pieaces

## 2023-02-18 LAB
ANION GAP SERPL CALC-SCNC: 6 MMOL/L (ref 0–18)
BASOPHILS # BLD AUTO: 0.01 X10(3) UL (ref 0–0.2)
BASOPHILS NFR BLD AUTO: 0.2 %
BUN BLD-MCNC: 30 MG/DL (ref 7–18)
BUN/CREAT SERPL: 30.6 (ref 10–20)
CALCIUM BLD-MCNC: 7.7 MG/DL (ref 8.5–10.1)
CHLORIDE SERPL-SCNC: 114 MMOL/L (ref 98–112)
CO2 SERPL-SCNC: 23 MMOL/L (ref 21–32)
CREAT BLD-MCNC: 0.98 MG/DL
DEPRECATED RDW RBC AUTO: 54.5 FL (ref 35.1–46.3)
EOSINOPHIL # BLD AUTO: 0 X10(3) UL (ref 0–0.7)
EOSINOPHIL NFR BLD AUTO: 0 %
ERYTHROCYTE [DISTWIDTH] IN BLOOD BY AUTOMATED COUNT: 15.8 % (ref 11–15)
GFR SERPLBLD BASED ON 1.73 SQ M-ARVRAT: 71 ML/MIN/1.73M2 (ref 60–?)
GLUCOSE BLD-MCNC: 113 MG/DL (ref 70–99)
HCT VFR BLD AUTO: 32 %
HGB BLD-MCNC: 9.7 G/DL
IMM GRANULOCYTES # BLD AUTO: 0.02 X10(3) UL (ref 0–1)
IMM GRANULOCYTES NFR BLD: 0.3 %
LYMPHOCYTES # BLD AUTO: 0.7 X10(3) UL (ref 1–4)
LYMPHOCYTES NFR BLD AUTO: 12 %
MCH RBC QN AUTO: 28.4 PG (ref 26–34)
MCHC RBC AUTO-ENTMCNC: 30.3 G/DL (ref 31–37)
MCV RBC AUTO: 93.8 FL
MONOCYTES # BLD AUTO: 0.38 X10(3) UL (ref 0.1–1)
MONOCYTES NFR BLD AUTO: 6.5 %
NEUTROPHILS # BLD AUTO: 4.72 X10 (3) UL (ref 1.5–7.7)
NEUTROPHILS # BLD AUTO: 4.72 X10(3) UL (ref 1.5–7.7)
NEUTROPHILS NFR BLD AUTO: 81 %
OSMOLALITY SERPL CALC.SUM OF ELEC: 303 MOSM/KG (ref 275–295)
PLATELET # BLD AUTO: 214 10(3)UL (ref 150–450)
POTASSIUM SERPL-SCNC: 4.1 MMOL/L (ref 3.5–5.1)
RBC # BLD AUTO: 3.41 X10(6)UL
SODIUM SERPL-SCNC: 143 MMOL/L (ref 136–145)
WBC # BLD AUTO: 5.8 X10(3) UL (ref 4–11)

## 2023-02-19 ENCOUNTER — APPOINTMENT (OUTPATIENT)
Dept: GENERAL RADIOLOGY | Facility: HOSPITAL | Age: 88
DRG: 690 | End: 2023-02-19
Attending: INTERNAL MEDICINE
Payer: MEDICARE

## 2023-02-19 ENCOUNTER — APPOINTMENT (OUTPATIENT)
Dept: GENERAL RADIOLOGY | Facility: HOSPITAL | Age: 88
End: 2023-02-19
Attending: INTERNAL MEDICINE
Payer: MEDICARE

## 2023-02-19 LAB
ANION GAP SERPL CALC-SCNC: 7 MMOL/L (ref 0–18)
BASOPHILS # BLD AUTO: 0.01 X10(3) UL (ref 0–0.2)
BASOPHILS NFR BLD AUTO: 0.2 %
BUN BLD-MCNC: 19 MG/DL (ref 7–18)
BUN/CREAT SERPL: 24.7 (ref 10–20)
CALCIUM BLD-MCNC: 8.2 MG/DL (ref 8.5–10.1)
CHLORIDE SERPL-SCNC: 110 MMOL/L (ref 98–112)
CO2 SERPL-SCNC: 24 MMOL/L (ref 21–32)
CREAT BLD-MCNC: 0.77 MG/DL
DEPRECATED RDW RBC AUTO: 50.7 FL (ref 35.1–46.3)
EOSINOPHIL # BLD AUTO: 0.07 X10(3) UL (ref 0–0.7)
EOSINOPHIL NFR BLD AUTO: 1.7 %
ERYTHROCYTE [DISTWIDTH] IN BLOOD BY AUTOMATED COUNT: 15.1 % (ref 11–15)
GFR SERPLBLD BASED ON 1.73 SQ M-ARVRAT: 83 ML/MIN/1.73M2 (ref 60–?)
GLUCOSE BLD-MCNC: 100 MG/DL (ref 70–99)
HCT VFR BLD AUTO: 32.2 %
HGB BLD-MCNC: 10 G/DL
IMM GRANULOCYTES # BLD AUTO: 0.01 X10(3) UL (ref 0–1)
IMM GRANULOCYTES NFR BLD: 0.2 %
LYMPHOCYTES # BLD AUTO: 0.94 X10(3) UL (ref 1–4)
LYMPHOCYTES NFR BLD AUTO: 22.9 %
MAGNESIUM SERPL-MCNC: 2.1 MG/DL (ref 1.6–2.6)
MCH RBC QN AUTO: 28.2 PG (ref 26–34)
MCHC RBC AUTO-ENTMCNC: 31.1 G/DL (ref 31–37)
MCV RBC AUTO: 91 FL
MONOCYTES # BLD AUTO: 0.51 X10(3) UL (ref 0.1–1)
MONOCYTES NFR BLD AUTO: 12.4 %
NEUTROPHILS # BLD AUTO: 2.57 X10 (3) UL (ref 1.5–7.7)
NEUTROPHILS # BLD AUTO: 2.57 X10(3) UL (ref 1.5–7.7)
NEUTROPHILS NFR BLD AUTO: 62.6 %
OSMOLALITY SERPL CALC.SUM OF ELEC: 294 MOSM/KG (ref 275–295)
PLATELET # BLD AUTO: 198 10(3)UL (ref 150–450)
POTASSIUM SERPL-SCNC: 3.8 MMOL/L (ref 3.5–5.1)
RBC # BLD AUTO: 3.54 X10(6)UL
SODIUM SERPL-SCNC: 141 MMOL/L (ref 136–145)
WBC # BLD AUTO: 4.1 X10(3) UL (ref 4–11)

## 2023-02-19 PROCEDURE — 97161 PT EVAL LOW COMPLEX 20 MIN: CPT

## 2023-02-19 PROCEDURE — 97530 THERAPEUTIC ACTIVITIES: CPT

## 2023-02-19 PROCEDURE — 80048 BASIC METABOLIC PNL TOTAL CA: CPT | Performed by: INTERNAL MEDICINE

## 2023-02-19 PROCEDURE — 73502 X-RAY EXAM HIP UNI 2-3 VIEWS: CPT | Performed by: INTERNAL MEDICINE

## 2023-02-19 PROCEDURE — 83735 ASSAY OF MAGNESIUM: CPT | Performed by: INTERNAL MEDICINE

## 2023-02-19 PROCEDURE — 97535 SELF CARE MNGMENT TRAINING: CPT

## 2023-02-19 PROCEDURE — 85025 COMPLETE CBC W/AUTO DIFF WBC: CPT | Performed by: INTERNAL MEDICINE

## 2023-02-19 RX ORDER — POLYETHYLENE GLYCOL 3350 17 G/17G
17 POWDER, FOR SOLUTION ORAL DAILY
Status: DISCONTINUED | OUTPATIENT
Start: 2023-02-19 | End: 2023-02-20

## 2023-02-19 RX ORDER — SENNOSIDES 8.6 MG
17.2 TABLET ORAL 2 TIMES DAILY
Status: DISCONTINUED | OUTPATIENT
Start: 2023-02-19 | End: 2023-02-20

## 2023-02-20 VITALS
HEIGHT: 64 IN | RESPIRATION RATE: 16 BRPM | BODY MASS INDEX: 28.73 KG/M2 | WEIGHT: 168.31 LBS | HEART RATE: 60 BPM | TEMPERATURE: 98 F | SYSTOLIC BLOOD PRESSURE: 148 MMHG | DIASTOLIC BLOOD PRESSURE: 61 MMHG | OXYGEN SATURATION: 96 %

## 2023-02-20 RX ORDER — TRAMADOL HYDROCHLORIDE 50 MG/1
50 TABLET ORAL 3 TIMES DAILY PRN
Qty: 8 TABLET | Refills: 0 | Status: SHIPPED | OUTPATIENT
Start: 2023-02-20

## 2023-02-21 ENCOUNTER — EXTERNAL FACILITY (OUTPATIENT)
Dept: INTERNAL MEDICINE CLINIC | Facility: CLINIC | Age: 88
End: 2023-02-21

## 2023-02-21 DIAGNOSIS — N40.1 BENIGN PROSTATIC HYPERPLASIA WITH LOWER URINARY TRACT SYMPTOMS, SYMPTOM DETAILS UNSPECIFIED: ICD-10-CM

## 2023-02-21 DIAGNOSIS — G45.9 TIA (TRANSIENT ISCHEMIC ATTACK): ICD-10-CM

## 2023-02-21 DIAGNOSIS — I35.9 AORTIC VALVE DISEASE: ICD-10-CM

## 2023-02-21 DIAGNOSIS — N39.0 URINARY TRACT INFECTION WITHOUT HEMATURIA, SITE UNSPECIFIED: ICD-10-CM

## 2023-02-21 DIAGNOSIS — I25.10 CORONARY ARTERY DISEASE, UNSPECIFIED VESSEL OR LESION TYPE, UNSPECIFIED WHETHER ANGINA PRESENT, UNSPECIFIED WHETHER NATIVE OR TRANSPLANTED HEART: ICD-10-CM

## 2023-02-21 DIAGNOSIS — A08.4 VIRAL GASTROENTERITIS: ICD-10-CM

## 2023-02-21 DIAGNOSIS — K21.9 GASTROESOPHAGEAL REFLUX DISEASE, UNSPECIFIED WHETHER ESOPHAGITIS PRESENT: ICD-10-CM

## 2023-02-21 DIAGNOSIS — R11.10 VOMITING, UNSPECIFIED VOMITING TYPE, UNSPECIFIED WHETHER NAUSEA PRESENT: Primary | ICD-10-CM

## 2023-02-21 DIAGNOSIS — E78.5 HYPERLIPIDEMIA, UNSPECIFIED HYPERLIPIDEMIA TYPE: ICD-10-CM

## 2023-02-21 DIAGNOSIS — I10 HYPERTENSION, UNSPECIFIED TYPE: ICD-10-CM

## 2023-02-21 DIAGNOSIS — Z87.19 HISTORY OF ULCERATIVE COLITIS: ICD-10-CM

## 2023-02-21 PROCEDURE — 99306 1ST NF CARE HIGH MDM 50: CPT | Performed by: INTERNAL MEDICINE

## 2023-02-21 PROCEDURE — 1111F DSCHRG MED/CURRENT MED MERGE: CPT | Performed by: INTERNAL MEDICINE

## 2023-02-23 ENCOUNTER — EXTERNAL FACILITY (OUTPATIENT)
Dept: INTERNAL MEDICINE CLINIC | Facility: CLINIC | Age: 88
End: 2023-02-23

## 2023-02-23 DIAGNOSIS — R11.10 VOMITING, UNSPECIFIED VOMITING TYPE, UNSPECIFIED WHETHER NAUSEA PRESENT: Primary | ICD-10-CM

## 2023-02-23 DIAGNOSIS — R05.9 COUGH, UNSPECIFIED TYPE: ICD-10-CM

## 2023-02-23 DIAGNOSIS — A08.4 VIRAL GASTROENTERITIS: ICD-10-CM

## 2023-02-23 DIAGNOSIS — Z87.19 HISTORY OF ULCERATIVE COLITIS: ICD-10-CM

## 2023-02-23 DIAGNOSIS — G45.9 TIA (TRANSIENT ISCHEMIC ATTACK): ICD-10-CM

## 2023-02-23 DIAGNOSIS — J98.01 BRONCHOSPASM: ICD-10-CM

## 2023-02-23 PROCEDURE — 1111F DSCHRG MED/CURRENT MED MERGE: CPT | Performed by: INTERNAL MEDICINE

## 2023-02-23 PROCEDURE — 99309 SBSQ NF CARE MODERATE MDM 30: CPT | Performed by: INTERNAL MEDICINE

## 2023-02-24 ENCOUNTER — EXTERNAL FACILITY (OUTPATIENT)
Dept: INTERNAL MEDICINE CLINIC | Facility: CLINIC | Age: 88
End: 2023-02-24

## 2023-02-24 DIAGNOSIS — R05.9 COUGH, UNSPECIFIED TYPE: ICD-10-CM

## 2023-02-24 DIAGNOSIS — K51.919 ULCERATIVE COLITIS WITH COMPLICATION, UNSPECIFIED LOCATION (HCC): ICD-10-CM

## 2023-02-24 DIAGNOSIS — J98.01 BRONCHOSPASM: ICD-10-CM

## 2023-02-24 DIAGNOSIS — A08.4 VIRAL GASTROENTERITIS: ICD-10-CM

## 2023-02-24 DIAGNOSIS — R11.10 VOMITING, UNSPECIFIED VOMITING TYPE, UNSPECIFIED WHETHER NAUSEA PRESENT: Primary | ICD-10-CM

## 2023-02-24 DIAGNOSIS — M75.02 ADHESIVE CAPSULITIS OF LEFT SHOULDER: ICD-10-CM

## 2023-02-24 PROCEDURE — 1111F DSCHRG MED/CURRENT MED MERGE: CPT | Performed by: INTERNAL MEDICINE

## 2023-02-24 PROCEDURE — 99309 SBSQ NF CARE MODERATE MDM 30: CPT | Performed by: INTERNAL MEDICINE

## 2023-02-27 ENCOUNTER — EXTERNAL FACILITY (OUTPATIENT)
Dept: INTERNAL MEDICINE CLINIC | Facility: CLINIC | Age: 88
End: 2023-02-27

## 2023-02-27 DIAGNOSIS — R11.10 VOMITING, UNSPECIFIED VOMITING TYPE, UNSPECIFIED WHETHER NAUSEA PRESENT: Primary | ICD-10-CM

## 2023-02-27 DIAGNOSIS — A08.4 VIRAL GASTROENTERITIS: ICD-10-CM

## 2023-02-27 DIAGNOSIS — J98.01 BRONCHOSPASM: ICD-10-CM

## 2023-02-27 DIAGNOSIS — K51.919 ULCERATIVE COLITIS WITH COMPLICATION, UNSPECIFIED LOCATION (HCC): ICD-10-CM

## 2023-02-27 DIAGNOSIS — R05.9 COUGH, UNSPECIFIED TYPE: ICD-10-CM

## 2023-02-27 DIAGNOSIS — M75.02 ADHESIVE CAPSULITIS OF LEFT SHOULDER: ICD-10-CM

## 2023-03-02 ENCOUNTER — EXTERNAL FACILITY (OUTPATIENT)
Dept: INTERNAL MEDICINE CLINIC | Facility: CLINIC | Age: 88
End: 2023-03-02

## 2023-03-02 DIAGNOSIS — R11.10 VOMITING, UNSPECIFIED VOMITING TYPE, UNSPECIFIED WHETHER NAUSEA PRESENT: Primary | ICD-10-CM

## 2023-03-02 DIAGNOSIS — A08.4 VIRAL GASTROENTERITIS: ICD-10-CM

## 2023-03-02 DIAGNOSIS — J98.01 BRONCHOSPASM: ICD-10-CM

## 2023-03-02 DIAGNOSIS — M75.02 ADHESIVE CAPSULITIS OF LEFT SHOULDER: ICD-10-CM

## 2023-03-02 DIAGNOSIS — K51.919 ULCERATIVE COLITIS WITH COMPLICATION, UNSPECIFIED LOCATION (HCC): ICD-10-CM

## 2023-03-02 DIAGNOSIS — R05.9 COUGH, UNSPECIFIED TYPE: ICD-10-CM

## 2023-03-02 PROCEDURE — 99309 SBSQ NF CARE MODERATE MDM 30: CPT | Performed by: INTERNAL MEDICINE

## 2023-03-02 PROCEDURE — 1111F DSCHRG MED/CURRENT MED MERGE: CPT | Performed by: INTERNAL MEDICINE

## 2023-03-06 ENCOUNTER — EXTERNAL FACILITY (OUTPATIENT)
Dept: INTERNAL MEDICINE CLINIC | Facility: CLINIC | Age: 88
End: 2023-03-06

## 2023-03-06 DIAGNOSIS — J98.01 BRONCHOSPASM: ICD-10-CM

## 2023-03-06 DIAGNOSIS — R05.9 COUGH, UNSPECIFIED TYPE: ICD-10-CM

## 2023-03-06 DIAGNOSIS — M75.02 ADHESIVE CAPSULITIS OF LEFT SHOULDER: ICD-10-CM

## 2023-03-06 DIAGNOSIS — K51.919 ULCERATIVE COLITIS WITH COMPLICATION, UNSPECIFIED LOCATION (HCC): ICD-10-CM

## 2023-03-06 DIAGNOSIS — R11.10 VOMITING, UNSPECIFIED VOMITING TYPE, UNSPECIFIED WHETHER NAUSEA PRESENT: Primary | ICD-10-CM

## 2023-03-06 DIAGNOSIS — A08.4 VIRAL GASTROENTERITIS: ICD-10-CM

## 2023-03-06 PROCEDURE — 1111F DSCHRG MED/CURRENT MED MERGE: CPT | Performed by: INTERNAL MEDICINE

## 2023-03-06 PROCEDURE — 99309 SBSQ NF CARE MODERATE MDM 30: CPT | Performed by: INTERNAL MEDICINE

## 2023-03-17 NOTE — PROGRESS NOTES
[facility] note transcribed by Dr. Iain Floyd    Date seen: 3/2/2023    Subjective: Patient seen and examined for vomiting, UTI, CHF, coarse cough, mesalamine dosage. Patient seems stable, doing well, no changes, tolerating the dosage of mesalamine, family at bedside, they are planning discharge for later this week. Seem to be very stable, working with physical therapy. Specialists following. PHYSICAL EXAMINATION: Vital signs: See chart  Gen. exam: Alert and awake, mental status at baseline, in no acute distress  HEENT: Pupils equal and reactive to light and accommodation, moist mucous membranes  Neck exam: Supple with baseline range of motion. Normal thyroid trachea midline, no JVD  Heart exam: Regular rate and rhythm, 2/6 AI murmurs no S3 no S4  Lung exam: no rhonchi no wheezes, no rales, no cough  Abdominal exam: Soft nontender, nondistended positive bowel sounds are normoactive, obese abdomen  Extremities exam: no clubbing no cyanosis no edema  Skin exam: see wound notes for details, no rashes  Neurological exam: Cranial nerves II through XII intact, no gross deficits  Musculoskeletal exam: Advanced bilateral generalized hand arthritis appreciated, no obvious deformity, advanced left shoulder arthritis, with frozen joint. Labs/imaging: See chart    DVT prophylaxis: with Plavix    Ambulatory status: Per hospital discharge recommendations, specialist involvement/recommendations and physical therapy evaluation    Assessment and plan: We have a 17-year-old male with coronary artery disease, TIA, admitted status post vomiting and gastritis, now admitted for rehab  Vomiting: Viral gastroenteritis: Physical therapy, occupational therapy, physiatry to evaluate and treat, further orders pending clinical course, no changes  Coarse cough/bronchospasm: Likely bronchitis:  We will use DuoNebs 3 times daily for 7 days, involve in-house pulmonology, resolved, okay to change to as needed nebulizers at this point  ulcerative colitis:  We will adjust the dosage to mesalamine 800s, 3 times a day, he takes 2400 mg every morning at home  Left frozen shoulder: Physical therapy, conservative management per physiatry    Stable problem list:  TIA unstable continue current protective medications, Plavix  Chronic constipation: Stable continue current medications  GERD: PPI, continue current monitoring  UTI: With questionable diagnosis, completed antibiotics in house, observation for recurrent  HTN: Stable continue current medications monitoring and management  CAD: Stable continue current medical management, offer in-house cardiology consult  Aortic Valve Disease: Stable, offer in-house cardiology consult continue current blood pressure control  HL: Stable continue current home medications  BPH: Stable continue current terazosin, and Flomax    CODE STATUS: Full

## 2023-03-17 NOTE — PROGRESS NOTES
[facility] care note transcribed by Dr. Vinnie Lr    Date seen: 3/6/2023    Subjective: Patient seen and examined for vomiting, UTI, CHF, coarse cough, mesalamine dosage. Patient seems stable, discharging home in the near future, he does claim that he has adequate support at home, lives in assisted living facility, and his family does follow closely, his medications are very stable he is working with physical therapy, joints continue to bother him. There were reports of norovirus in the building, with multiple patient infections, but patient seems to be stable, not symptomatic, not isolated. PHYSICAL EXAMINATION: Vital signs: See chart  Gen. exam: Alert and awake, mental status at baseline, in no acute distress  HEENT: Pupils equal and reactive to light and accommodation, moist mucous membranes  Neck exam: Supple with baseline range of motion. Normal thyroid trachea midline, no JVD  Heart exam: Regular rate and rhythm, 2/6 AI murmurs no S3 no S4  Lung exam: no rhonchi no wheezes, no rales, no cough  Abdominal exam: Soft nontender, nondistended positive bowel sounds are normoactive, obese abdomen  Extremities exam: no clubbing no cyanosis no edema  Skin exam: see wound notes for details, no rashes  Neurological exam: Cranial nerves II through XII intact, no gross deficits  Musculoskeletal exam: Advanced bilateral generalized hand arthritis appreciated, no obvious deformity, advanced left shoulder arthritis, with frozen joint.     Labs/imaging: See chart    DVT prophylaxis: with Plavix    Ambulatory status: Per hospital discharge recommendations, specialist involvement/recommendations and physical therapy evaluation    Assessment and plan: We have a 40-year-old male with coronary artery disease, TIA, admitted status post vomiting and gastritis, now admitted for rehab  Vomiting: Viral gastroenteritis: Physical therapy, occupational therapy, physiatry to evaluate and treat, further orders pending clinical course, no changes, discharge plan  Coarse cough/bronchospasm: Likely bronchitis: Complete nebulizers on discharge  ulcerative colitis:  We will adjust the dosage to mesalamine 800s, 3 times a day, he takes 2400 mg every morning at home  Left frozen shoulder: Physical therapy, conservative management per physiatry    Stable problem list:  TIA unstable continue current protective medications, Plavix  Chronic constipation: Stable continue current medications  GERD: PPI, continue current monitoring  UTI: With questionable diagnosis, completed antibiotics in house, observation for recurrent  HTN: Stable continue current medications monitoring and management  CAD: Stable continue current medical management, offer in-house cardiology consult  Aortic Valve Disease: Stable, offer in-house cardiology consult continue current blood pressure control  HL: Stable continue current home medications  BPH: Stable continue current terazosin, and Flomax    CODE STATUS: Full

## 2023-03-17 NOTE — PROGRESS NOTES
[facility] care note transcribed by Dr. Cullen Ramires    Date seen: 2/27/2023    Subjective: Patient seen and examined for vomiting, UTI, CHF, coarse cough, mesalamine dosage. Patient seems to have had an uneventful weekend, he did find out his mesalamine dosage which he takes to 1200 mg tablets in the morning every morning. He did see the physiologist over the weekend, and had a pain patch prescribed as well as a discussion about his shoulder. They did not recommend any procedures. He does have physical therapy today, looking forward to an uneventful week. Breathing looks stable, likely does not need the nebulizers at this point    PHYSICAL EXAMINATION: Vital signs: See chart  Gen. exam: Alert and awake, mental status at baseline, in no acute distress  HEENT: Pupils equal and reactive to light and accommodation, moist mucous membranes  Neck exam: Supple with baseline range of motion. Normal thyroid trachea midline, no JVD  Heart exam: Regular rate and rhythm, 2/6 AI murmurs no S3 no S4  Lung exam: no rhonchi no wheezes, no rales, no cough  Abdominal exam: Soft nontender, nondistended positive bowel sounds are normoactive, obese abdomen  Extremities exam: no clubbing no cyanosis no edema  Skin exam: see wound notes for details, no rashes  Neurological exam: Cranial nerves II through XII intact, no gross deficits  Musculoskeletal exam: Advanced bilateral generalized hand arthritis appreciated, no obvious deformity, advanced left shoulder arthritis, with frozen joint.     Labs/imaging: See chart    DVT prophylaxis: with Plavix    Ambulatory status: Per hospital discharge recommendations, specialist involvement/recommendations and physical therapy evaluation    Assessment and plan: We have a 40-year-old male with coronary artery disease, TIA, admitted status post vomiting and gastritis, now admitted for rehab  Vomiting: Viral gastroenteritis: Physical therapy, occupational therapy, physiatry to evaluate and treat, further orders pending clinical course  Coarse cough/bronchospasm: Likely bronchitis: We will use DuoNebs 3 times daily for 7 days, involve in-house pulmonology, resolved, okay to change to as needed nebulizers at this point  ulcerative colitis:  We will adjust the dosage to mesalamine 800s, 3 times a day, he takes 2400 mg every morning at home  Left frozen shoulder: Physical therapy, conservative management per physiatry    Stable problem list:  TIA unstable continue current protective medications, Plavix  Chronic constipation: Stable continue current medications  GERD: PPI, continue current monitoring  UTI: With questionable diagnosis, completed antibiotics in house, observation for recurrent  HTN: Stable continue current medications monitoring and management  CAD: Stable continue current medical management, offer in-house cardiology consult  Aortic Valve Disease: Stable, offer in-house cardiology consult continue current blood pressure control  HL: Stable continue current home medications  BPH: Stable continue current terazosin, and Flomax    CODE STATUS: Full

## 2023-03-17 NOTE — PROGRESS NOTES
[facility] note transcribed by Dr. Olmstead     Date seen: 2/23/2023    Subjective: Patient seen and examined for vomiting, UTI, CHF, TIA. Patient seems stable, doing well, But has coarse cough today. He was not seen by the pulmonologist, and seems to be doing very well, he did have this on last admission, and required nebulizer treatments, he denies any choking or coughing after swallowing, and seems to be compliant with his current medications, he is working with physical therapy, though he claims that has been very slow going so far. PHYSICAL EXAMINATION: Vital signs: See chart  Gen. exam: Alert and awake, mental status at baseline, in no acute distress  HEENT: Pupils equal and reactive to light and accommodation, moist mucous membranes  Neck exam: Supple with baseline range of motion. Normal thyroid trachea midline, no JVD  Heart exam: Regular rate and rhythm no murmurs no S3 no S4  Lung exam: No rales no rhonchi no wheezes, coarse cough  Abdominal exam: Soft nontender, nondistended positive bowel sounds are normoactive, obese abdomen  Extremities exam: no clubbing no cyanosis no edema  Skin exam: see wound notes for details, no rashes  Neurological exam: Cranial nerves II through XII intact, no gross deficits  Musculoskeletal exam: Advanced bilateral generalized hand arthritis appreciated, no obvious deformity    Labs/imaging: See chart    DVT prophylaxis: with Plavix    Ambulatory status: Per hospital discharge recommendations, specialist involvement/recommendations and physical therapy evaluation    Assessment and plan: We have a 80year-old male with coronary artery disease, TIA, admitted status post vomiting and gastritis, now admitted for rehab  Vomiting: Viral gastroenteritis: Physical therapy, occupational therapy, physiatry to evaluate and treat, further orders pending clinical course  Coarse cough/bronchospasm: Likely bronchitis:  We will use DuoNebs 3 times daily for 7 days, involve in-house pulmonology  TIA unstable continue current protective medications, Plavix  History of ulcerative colitis: Continue with current mesalamine dosage    Stable problem list:  Chronic constipation: Stable continue current medications  GERD: PPI, continue current monitoring  UTI: With questionable diagnosis, completed antibiotics in house, observation for recurrent  HTN: Stable continue current medications monitoring and management  CAD: Stable continue current medical management, offer in-house cardiology consult  Aortic Valve Disease: Stable, offer in-house cardiology consult continue current blood pressure control  HL: Stable continue current home medications  BPH: Stable continue current terazosin, and Flomax    CODE STATUS: Full

## 2023-03-17 NOTE — PROGRESS NOTES
History and physical    [facility] care note transcribed by Dr. Zoe Tadeo date: 2/20/2023  Date seen: 2/21/2023    Chief Complaint: Status post gastroenteritis/viral    HPI: Patient is a 22-year-old male who was admitted with vomiting and nausea, he was discovered to have a contaminated urine, he was started on IV antibiotics, fluid resuscitated, stabilized, improved, now is transitioned here to [address] [place] [address] in stable condition for rehab. Patient seen and examined by me, known to me from previous admissions here, seems pleasant as usual, medications continued as recommended by the discharging hospital physician. Patient seems to be motivated for rehab and doing well. Past Medical History:  Diagnosis Date  Arthritis  BPH (benign prostatic hyperplasia)  Colitis  Heart disease  High blood pressure  High cholesterol  Osteoarthritis  TIA (transient ischemic attack)      PSH  Past Surgical History:  Procedure Laterality Date  CARDIAC VALVE SURGERY  Aortic      ALL:    Adhesive Tape RASH    Home Medications:    Medications Taking  traMADol 50 MG Oral Tab, Take 1 tablet (50 mg total) by mouth 3 (three) times daily as needed for Pain., Disp: , Rfl:  Polyethylene Glycol 3350 17 g Oral Powd Pack, Take 17 g by mouth in the morning and 17 g before bedtime. , Disp: , Rfl:  ipratropium-albuterol 0.5-2.5 (3) MG/3ML Inhalation Solution, Take 3 mL by nebulization 2 (two) times daily as needed. , Disp: , Rfl:  budesonide (PULMICORT) 0.5 MG/2ML Inhalation Suspension, Take 2 mL (0.5 mg total) by nebulization daily as needed (shortness of breath, wheezing). , Disp: , Rfl:  diphenhydrAMINE 25 MG Oral Cap, Take 1 capsule (25 mg total) by mouth every 4 (four) hours as needed for Itching., Disp: , Rfl:  nystatin 100,000 Units/g External Cream, Apply topically 2 (two) times daily. , Disp: , Rfl:  tacrolimus 0.1 % External Ointment, Apply topically 2 (two) times daily.  Apply small amount topically BID to affected area of penis and groin, Disp: , Rfl:  pimecrolimus 1 % External Cream, Apply topically 2 (two) times daily. Apply small amount topically BID, apply thin layer to affected are in groin, Disp: , Rfl:  ascorbic acid 500 MG Oral Tab, Take 1 tablet (500 mg total) by mouth daily. , Disp: , Rfl:  ferrous sulfate 325 (65 FE) MG Oral Tab EC, Take 1 tablet (325 mg total) by mouth daily. , Disp: , Rfl:  alum-mag hydroxide-simethicone (ANTACID) 516-068-24 MG/5ML Oral Suspension, Take 15 mL by mouth every 6 (six) hours as needed for Indigestion. Every 6 hours as needed for heartburn, Disp: , Rfl:  Multiple Vitamins-Minerals (PRESERVISION AREDS 2+MULTI VIT) Oral Cap, Take 1 capsule by mouth in the morning and 1 capsule before bedtime. , Disp: , Rfl:  Acetaminophen  MG Oral Tab CR, Take 1 tablet (650 mg total) by mouth every 6 (six) hours as needed for Pain., Disp: , Rfl:  Carboxymethylcellulose Sodium 1 % Ophthalmic Solution, Place 1 drop into both eyes 4 (four) times daily as needed. 1 drop both eyes as needed four times daily, Disp: , Rfl:  Vitamin D3 2000 units Oral Cap, Take 1 capsule (2,000 Units total) by mouth daily. , Disp: , Rfl:  Clopidogrel Bisulfate 75 MG Oral Tab, Take 1 tablet (75 mg total) by mouth daily. , Disp: , Rfl:  finasteride 5 MG Oral Tab, Take 1 tablet (5 mg total) by mouth daily. , Disp: , Rfl:  lisinopril 5 MG Oral Tab, Take 0.5 tablets (2.5 mg total) by mouth daily. , Disp: , Rfl:  magnesium oxide 400 MG Oral Tab, Take 420 mg by mouth daily. , Disp: , Rfl:  mesalamine 400 MG Oral Capsule Delayed Release, Take 3 capsules (1,200 mg total) by mouth 4 (four) times daily. Takes two tablets once daily, Disp: , Rfl:  omeprazole 20 MG Oral Capsule Delayed Release, Take 1 capsule (20 mg total) by mouth every morning before breakfast., Disp: , Rfl:  simvastatin 40 MG Oral Tab, Take 1 tablet (40 mg total) by mouth daily. , Disp: , Rfl:  Terazosin HCl 5 MG Oral Cap, Take 1 capsule (5 mg total) by mouth daily. , Disp: , Rfl:      Soc Hx  Social History  Tobacco Use  Smoking status: Former  Types: Cigarettes  Quit date: 1988  Years since quittin.1  Smokeless tobacco: Not on file  Vaping Use  Vaping status: Not on file  Alcohol use: Not on file      Fam Hx  History reviewed. No pertinent family history. Current medications: See chart    REVIEW OF SYSTEMS:  Constitutional: Negative for Chills, fatigue, fever, malaise, weight gain and weight loss. ENMT: Negative for Nasal drainage and sinus pressure. Eyes: Negative for Vision changes. Respiratory: Negative for Cough, dyspnea and wheezing. Cardio: Negative Chest pain and irregular heartbeat/palpitations. GI: Negative for Abdominal pain, constipation, diarrhea, heartburn, nausea and vomiting. : Negative for Dysuria and urinary frequency. Endocrine: Negative for Cold intolerance and heat intolerance. Neuro: Negative for Gait disturbance and memory impairment. Psych: Negative for Anxiety and depression. Integumentary: Negative for Hives and rash. MS: Negative muscle weakness. pos for joint pain  Hema/Lymph: Negative Easy bleeding and easy bruising. Allergic/Immuno: Negative Environmental allergies and food allergies. PHYSICAL EXAMINATION: Vital signs: See chart  Gen. exam: Alert and awake, mental status at baseline, in no acute distress  HEENT: Pupils equal and reactive to light and accommodation, moist mucous membranes  Neck exam: Supple with baseline range of motion.  Normal thyroid trachea midline, no JVD  Heart exam: Regular rate and rhythm no murmurs no S3 no S4  Lung exam: No rales no rhonchi no wheezes  Abdominal exam: Soft nontender, nondistended positive bowel sounds are normoactive, obese abdomen  Extremities exam: no clubbing no cyanosis no edema  Skin exam: see wound notes for details, no rashes  Neurological exam: Cranial nerves II through XII intact, no gross deficits  Musculoskeletal exam: Advanced bilateral generalized hand arthritis appreciated, no obvious deformity    Labs/imaging: See chart    DVT prophylaxis: with Plavix    Ambulatory status: Per hospital discharge recommendations, specialist involvement/recommendations and physical therapy evaluation    Assessment and plan: We have a 42-year-old male with coronary artery disease, TIA, admitted status post vomiting and gastritis, now admitted for rehab  Vomiting: Viral gastroenteritis: Physical therapy, occupational therapy, physiatry to evaluate and treat, further orders pending clinical course  Chronic constipation: Stable continue current medications  History of ulcerative colitis: Continue with current mesalamine dosage  GERD: PPI, continue current monitoring  UTI: With questionable diagnosis, completed antibiotics in house, observation for recurrent  HTN: Stable continue current medications monitoring and management  CAD: Stable continue current medical management, offer in-house cardiology consult  Aortic Valve Disease: Stable, offer in-house cardiology consult continue current blood pressure control  TIA unstable continue current protective medications, aspirin  HL: Stable continue current home medications  BPH: Stable continue current terazosin, and Flomax    CODE STATUS: Full  admit condition: Stable    Over 60 minutes spent in direct patient contact reviewing and creating admission orders, obtaining history, evaluating patient, discussing treatment options, family/staff communication, review of available labs and radiology reports, completing documentation, and coordinating care

## 2023-03-17 NOTE — PROGRESS NOTES
[facility] care note transcribed by Dr. Iain Floyd    Date seen: 2/24/2023    Subjective: Patient seen and examined for vomiting, UTI, CHF, coarse cough, mesalamine dosage. Patient seems stable, doing well, cough is much improved, he is doing the nebulizer treatments and claims are helping very much. He has been working with physical therapy, claims he had a good session today, also has questions about how many mesalamine pills he is taking, he is slated to get 1200 mg 3 times daily, he claims he only takes 2 pills of this at home once daily for the last 30 years and it has worked to control his bowel symptoms. Claims his left shoulder is bothering him as well, he is requesting an x-ray    PHYSICAL EXAMINATION: Vital signs: See chart  Gen. exam: Alert and awake, mental status at baseline, in no acute distress  HEENT: Pupils equal and reactive to light and accommodation, moist mucous membranes  Neck exam: Supple with baseline range of motion. Normal thyroid trachea midline, no JVD  Heart exam: Regular rate and rhythm, 2/6 AI murmurs no S3 no S4  Lung exam: no rhonchi no wheezes, left lower lung rales, coarse cough  Abdominal exam: Soft nontender, nondistended positive bowel sounds are normoactive, obese abdomen  Extremities exam: no clubbing no cyanosis no edema  Skin exam: see wound notes for details, no rashes  Neurological exam: Cranial nerves II through XII intact, no gross deficits  Musculoskeletal exam: Advanced bilateral generalized hand arthritis appreciated, no obvious deformity, advanced left shoulder arthritis, with frozen joint.     Labs/imaging: See chart    DVT prophylaxis: with Plavix    Ambulatory status: Per hospital discharge recommendations, specialist involvement/recommendations and physical therapy evaluation    Assessment and plan: We have a 26-year-old male with coronary artery disease, TIA, admitted status post vomiting and gastritis, now admitted for rehab  Vomiting: Viral gastroenteritis: Physical therapy, occupational therapy, physiatry to evaluate and treat, further orders pending clinical course  Coarse cough/bronchospasm: Likely bronchitis: We will use DuoNebs 3 times daily for 7 days, involve in-house pulmonology, improving  ulcerative colitis: Continue with current dosage, 1200 mg 3 times daily, but the family will have to weigh in on how much he has been taking at home, we could certainly adjust this downward to his home dosage if he is certain about the home dosage. He will contact his family  Left frozen shoulder: We will ask Dr. Marilyn Milian to see, and possibly inject with steroids, imaging per physiatry.     Stable problem list:  TIA unstable continue current protective medications, Plavix  Chronic constipation: Stable continue current medications  GERD: PPI, continue current monitoring  UTI: With questionable diagnosis, completed antibiotics in house, observation for recurrent  HTN: Stable continue current medications monitoring and management  CAD: Stable continue current medical management, offer in-house cardiology consult  Aortic Valve Disease: Stable, offer in-house cardiology consult continue current blood pressure control  HL: Stable continue current home medications  BPH: Stable continue current terazosin, and Flomax    CODE STATUS: Full

## 2023-04-08 ENCOUNTER — HOSPITAL ENCOUNTER (EMERGENCY)
Facility: HOSPITAL | Age: 88
Discharge: HOME | End: 2023-04-08
Attending: EMERGENCY MEDICINE
Payer: MEDICARE

## 2023-04-08 ENCOUNTER — APPOINTMENT (OUTPATIENT)
Dept: CT IMAGING | Facility: HOSPITAL | Age: 88
End: 2023-04-08
Attending: EMERGENCY MEDICINE
Payer: MEDICARE

## 2023-04-08 ENCOUNTER — APPOINTMENT (OUTPATIENT)
Dept: GENERAL RADIOLOGY | Facility: HOSPITAL | Age: 88
End: 2023-04-08
Attending: EMERGENCY MEDICINE
Payer: MEDICARE

## 2023-04-08 VITALS
SYSTOLIC BLOOD PRESSURE: 147 MMHG | TEMPERATURE: 98 F | DIASTOLIC BLOOD PRESSURE: 77 MMHG | WEIGHT: 165 LBS | BODY MASS INDEX: 28.17 KG/M2 | HEIGHT: 64 IN | RESPIRATION RATE: 20 BRPM | HEART RATE: 72 BPM | OXYGEN SATURATION: 97 %

## 2023-04-08 DIAGNOSIS — L89.229 PRESSURE INJURY OF SKIN OF LEFT HIP, UNSPECIFIED INJURY STAGE: Primary | ICD-10-CM

## 2023-04-08 LAB
ANION GAP SERPL CALC-SCNC: 3 MMOL/L (ref 0–18)
BASOPHILS # BLD AUTO: 0.03 X10(3) UL (ref 0–0.2)
BASOPHILS NFR BLD AUTO: 0.6 %
BUN BLD-MCNC: 13 MG/DL (ref 7–18)
BUN/CREAT SERPL: 14.6 (ref 10–20)
CALCIUM BLD-MCNC: 9.3 MG/DL (ref 8.5–10.1)
CHLORIDE SERPL-SCNC: 107 MMOL/L (ref 98–112)
CO2 SERPL-SCNC: 28 MMOL/L (ref 21–32)
CREAT BLD-MCNC: 0.89 MG/DL
DEPRECATED RDW RBC AUTO: 49.7 FL (ref 35.1–46.3)
EOSINOPHIL # BLD AUTO: 0.09 X10(3) UL (ref 0–0.7)
EOSINOPHIL NFR BLD AUTO: 1.7 %
ERYTHROCYTE [DISTWIDTH] IN BLOOD BY AUTOMATED COUNT: 15.4 % (ref 11–15)
GFR SERPLBLD BASED ON 1.73 SQ M-ARVRAT: 79 ML/MIN/1.73M2 (ref 60–?)
GLUCOSE BLD-MCNC: 104 MG/DL (ref 70–99)
HCT VFR BLD AUTO: 39.5 %
HGB BLD-MCNC: 12.2 G/DL
IMM GRANULOCYTES # BLD AUTO: 0.01 X10(3) UL (ref 0–1)
IMM GRANULOCYTES NFR BLD: 0.2 %
LYMPHOCYTES # BLD AUTO: 1.22 X10(3) UL (ref 1–4)
LYMPHOCYTES NFR BLD AUTO: 22.7 %
MCH RBC QN AUTO: 27.5 PG (ref 26–34)
MCHC RBC AUTO-ENTMCNC: 30.9 G/DL (ref 31–37)
MCV RBC AUTO: 89 FL
MONOCYTES # BLD AUTO: 0.47 X10(3) UL (ref 0.1–1)
MONOCYTES NFR BLD AUTO: 8.8 %
NEUTROPHILS # BLD AUTO: 3.55 X10 (3) UL (ref 1.5–7.7)
NEUTROPHILS # BLD AUTO: 3.55 X10(3) UL (ref 1.5–7.7)
NEUTROPHILS NFR BLD AUTO: 66 %
OSMOLALITY SERPL CALC.SUM OF ELEC: 286 MOSM/KG (ref 275–295)
PLATELET # BLD AUTO: 276 10(3)UL (ref 150–450)
POTASSIUM SERPL-SCNC: 4.6 MMOL/L (ref 3.5–5.1)
RBC # BLD AUTO: 4.44 X10(6)UL
SODIUM SERPL-SCNC: 138 MMOL/L (ref 136–145)
WBC # BLD AUTO: 5.4 X10(3) UL (ref 4–11)

## 2023-04-08 PROCEDURE — 99285 EMERGENCY DEPT VISIT HI MDM: CPT

## 2023-04-08 PROCEDURE — 99284 EMERGENCY DEPT VISIT MOD MDM: CPT

## 2023-04-08 PROCEDURE — 80048 BASIC METABOLIC PNL TOTAL CA: CPT | Performed by: EMERGENCY MEDICINE

## 2023-04-08 PROCEDURE — 73700 CT LOWER EXTREMITY W/O DYE: CPT | Performed by: EMERGENCY MEDICINE

## 2023-04-08 PROCEDURE — 85025 COMPLETE CBC W/AUTO DIFF WBC: CPT | Performed by: EMERGENCY MEDICINE

## 2023-04-08 PROCEDURE — 36415 COLL VENOUS BLD VENIPUNCTURE: CPT

## 2023-04-08 PROCEDURE — 73502 X-RAY EXAM HIP UNI 2-3 VIEWS: CPT | Performed by: EMERGENCY MEDICINE

## 2023-04-08 RX ORDER — CEPHALEXIN 500 MG/1
500 CAPSULE ORAL 4 TIMES DAILY
Qty: 20 CAPSULE | Refills: 0 | Status: SHIPPED | OUTPATIENT
Start: 2023-04-08 | End: 2023-04-13

## 2023-04-08 RX ORDER — CEPHALEXIN 500 MG/1
500 CAPSULE ORAL 4 TIMES DAILY
Qty: 20 CAPSULE | Refills: 0 | Status: SHIPPED | OUTPATIENT
Start: 2023-04-08 | End: 2023-04-08

## 2023-04-08 NOTE — ED QUICK NOTES
Noted reddened area to patient's left hip. Patient states he sleeps on his left side and believes it may be irritated.

## 2023-04-08 NOTE — ED QUICK NOTES
Rounding Completed    Plan of Care reviewed. Waiting for CT result. Elimination needs assessed. Provided update on results. Bed is locked and in lowest position. Call light within reach. Patient agreeable.

## 2023-04-08 NOTE — ED QUICK NOTES
[facility] staff notified that patient will be returning.  Staff will be present in 2-3 hours and expecting his return. [phone]

## 2023-04-08 NOTE — ED INITIAL ASSESSMENT (HPI)
Patient arrived via EMS from [facility][1] for evaluation of left hip wound redness. Patient denies pain. 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

## 2024-10-29 ENCOUNTER — LAB REQUISITION (OUTPATIENT)
Dept: LAB | Facility: HOSPITAL | Age: 89
End: 2024-10-29
Payer: MEDICARE

## 2024-10-29 DIAGNOSIS — R53.83 OTHER FATIGUE: ICD-10-CM

## 2024-10-29 LAB
ALBUMIN SERPL-MCNC: 3.5 G/DL (ref 3.2–4.8)
ALBUMIN/GLOB SERPL: 1.3 {RATIO} (ref 1–2)
ALP LIVER SERPL-CCNC: 92 U/L
ALT SERPL-CCNC: <7 U/L
ANION GAP SERPL CALC-SCNC: 6 MMOL/L (ref 0–18)
AST SERPL-CCNC: 12 U/L (ref ?–34)
BASOPHILS # BLD AUTO: 0.03 X10(3) UL (ref 0–0.2)
BASOPHILS NFR BLD AUTO: 0.4 %
BILIRUB SERPL-MCNC: 0.5 MG/DL (ref 0.2–0.9)
BUN BLD-MCNC: 12 MG/DL (ref 9–23)
BUN/CREAT SERPL: 14.3 (ref 10–20)
CALCIUM BLD-MCNC: 8.8 MG/DL (ref 8.7–10.4)
CHLORIDE SERPL-SCNC: 110 MMOL/L (ref 98–112)
CO2 SERPL-SCNC: 27 MMOL/L (ref 21–32)
CREAT BLD-MCNC: 0.84 MG/DL
DEPRECATED RDW RBC AUTO: 55.2 FL (ref 35.1–46.3)
EGFRCR SERPLBLD CKD-EPI 2021: 80 ML/MIN/1.73M2 (ref 60–?)
EOSINOPHIL # BLD AUTO: 0.33 X10(3) UL (ref 0–0.7)
EOSINOPHIL NFR BLD AUTO: 4.9 %
ERYTHROCYTE [DISTWIDTH] IN BLOOD BY AUTOMATED COUNT: 16.5 % (ref 11–15)
GLOBULIN PLAS-MCNC: 2.8 G/DL (ref 2–3.5)
GLUCOSE BLD-MCNC: 83 MG/DL (ref 70–99)
HCT VFR BLD AUTO: 35.8 %
HGB BLD-MCNC: 11.2 G/DL
IMM GRANULOCYTES # BLD AUTO: 0.02 X10(3) UL (ref 0–1)
IMM GRANULOCYTES NFR BLD: 0.3 %
LYMPHOCYTES # BLD AUTO: 1.1 X10(3) UL (ref 1–4)
LYMPHOCYTES NFR BLD AUTO: 16.2 %
MCH RBC QN AUTO: 28.5 PG (ref 26–34)
MCHC RBC AUTO-ENTMCNC: 31.3 G/DL (ref 31–37)
MCV RBC AUTO: 91.1 FL
MONOCYTES # BLD AUTO: 0.66 X10(3) UL (ref 0.1–1)
MONOCYTES NFR BLD AUTO: 9.7 %
NEUTROPHILS # BLD AUTO: 4.63 X10 (3) UL (ref 1.5–7.7)
NEUTROPHILS # BLD AUTO: 4.63 X10(3) UL (ref 1.5–7.7)
NEUTROPHILS NFR BLD AUTO: 68.5 %
OSMOLALITY SERPL CALC.SUM OF ELEC: 295 MOSM/KG (ref 275–295)
PLATELET # BLD AUTO: 295 10(3)UL (ref 150–450)
POTASSIUM SERPL-SCNC: 4.5 MMOL/L (ref 3.5–5.1)
PROT SERPL-MCNC: 6.3 G/DL (ref 5.7–8.2)
RBC # BLD AUTO: 3.93 X10(6)UL
SODIUM SERPL-SCNC: 143 MMOL/L (ref 136–145)
TSI SER-ACNC: 3.74 MIU/ML (ref 0.55–4.78)
WBC # BLD AUTO: 6.8 X10(3) UL (ref 4–11)

## 2024-10-29 PROCEDURE — 80053 COMPREHEN METABOLIC PANEL: CPT | Performed by: INTERNAL MEDICINE

## 2024-10-29 PROCEDURE — 84443 ASSAY THYROID STIM HORMONE: CPT | Performed by: INTERNAL MEDICINE

## 2024-10-29 PROCEDURE — 85025 COMPLETE CBC W/AUTO DIFF WBC: CPT | Performed by: INTERNAL MEDICINE

## 2024-10-30 ENCOUNTER — LAB REQUISITION (OUTPATIENT)
Dept: LAB | Facility: HOSPITAL | Age: 89
End: 2024-10-30
Payer: MEDICARE

## 2024-10-30 DIAGNOSIS — R53.83 OTHER FATIGUE: ICD-10-CM

## 2024-10-30 LAB
BILIRUB UR QL: NEGATIVE
CLARITY UR: CLEAR
COLOR UR: YELLOW
GLUCOSE UR-MCNC: NORMAL MG/DL
HGB UR QL STRIP.AUTO: NEGATIVE
KETONES UR-MCNC: NEGATIVE MG/DL
LEUKOCYTE ESTERASE UR QL STRIP.AUTO: 250
NITRITE UR QL STRIP.AUTO: NEGATIVE
PH UR: 6 [PH] (ref 5–8)
SP GR UR STRIP: 1.02 (ref 1–1.03)
UROBILINOGEN UR STRIP-ACNC: NORMAL

## 2024-10-30 PROCEDURE — 87086 URINE CULTURE/COLONY COUNT: CPT

## 2024-10-30 PROCEDURE — 81001 URINALYSIS AUTO W/SCOPE: CPT | Performed by: INTERNAL MEDICINE

## 2024-10-30 PROCEDURE — 81001 URINALYSIS AUTO W/SCOPE: CPT

## 2024-10-30 PROCEDURE — 87086 URINE CULTURE/COLONY COUNT: CPT | Performed by: INTERNAL MEDICINE

## 2025-02-15 ENCOUNTER — HOSPITAL ENCOUNTER (INPATIENT)
Facility: HOSPITAL | Age: OVER 89
LOS: 2 days | Discharge: NURSING, REHAB OR LONG-TERM CARE | End: 2025-02-18
Attending: EMERGENCY MEDICINE | Admitting: INTERNAL MEDICINE
Payer: MEDICARE

## 2025-02-15 ENCOUNTER — APPOINTMENT (OUTPATIENT)
Dept: GENERAL RADIOLOGY | Facility: HOSPITAL | Age: OVER 89
End: 2025-02-15
Attending: EMERGENCY MEDICINE
Payer: MEDICARE

## 2025-02-15 DIAGNOSIS — R09.02 HYPOXEMIA: ICD-10-CM

## 2025-02-15 DIAGNOSIS — U07.1 COVID: Primary | ICD-10-CM
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
## 2025-02-15 DIAGNOSIS — J18.9 NOSOCOMIAL PNEUMONIA: ICD-10-CM

## 2025-02-15 LAB
ALBUMIN SERPL-MCNC: 3.2 G/DL (ref 3.2–4.8)
ALBUMIN/GLOB SERPL: 1 {RATIO} (ref 1–2)
ALP LIVER SERPL-CCNC: 98 U/L
ALT SERPL-CCNC: 11 U/L
ANION GAP SERPL CALC-SCNC: 9 MMOL/L (ref 0–18)
AST SERPL-CCNC: 31 U/L (ref ?–34)
BASOPHILS # BLD AUTO: 0.03 X10(3) UL (ref 0–0.2)
BASOPHILS NFR BLD AUTO: 0.2 %
BILIRUB SERPL-MCNC: 0.4 MG/DL (ref 0.2–0.9)
BUN BLD-MCNC: 22 MG/DL (ref 9–23)
BUN/CREAT SERPL: 18.8 (ref 10–20)
CALCIUM BLD-MCNC: 8.2 MG/DL (ref 8.7–10.4)
CHLORIDE SERPL-SCNC: 106 MMOL/L (ref 98–112)
CO2 SERPL-SCNC: 23 MMOL/L (ref 21–32)
CREAT BLD-MCNC: 1.17 MG/DL
DEPRECATED RDW RBC AUTO: 52.6 FL (ref 35.1–46.3)
EGFRCR SERPLBLD CKD-EPI 2021: 57 ML/MIN/1.73M2 (ref 60–?)
EOSINOPHIL # BLD AUTO: 0.1 X10(3) UL (ref 0–0.7)
EOSINOPHIL NFR BLD AUTO: 0.8 %
ERYTHROCYTE [DISTWIDTH] IN BLOOD BY AUTOMATED COUNT: 16.4 % (ref 11–15)
GLOBULIN PLAS-MCNC: 3.2 G/DL (ref 2–3.5)
GLUCOSE BLD-MCNC: 94 MG/DL (ref 70–99)
HCT VFR BLD AUTO: 35.7 %
HGB BLD-MCNC: 11.4 G/DL
IMM GRANULOCYTES # BLD AUTO: 0.04 X10(3) UL (ref 0–1)
IMM GRANULOCYTES NFR BLD: 0.3 %
LACTATE SERPL-SCNC: 1.1 MMOL/L (ref 0.5–2)
LYMPHOCYTES # BLD AUTO: 1.73 X10(3) UL (ref 1–4)
LYMPHOCYTES NFR BLD AUTO: 13.5 %
MAGNESIUM SERPL-MCNC: 1.9 MG/DL (ref 1.6–2.6)
MCH RBC QN AUTO: 27.9 PG (ref 26–34)
MCHC RBC AUTO-ENTMCNC: 31.9 G/DL (ref 31–37)
MCV RBC AUTO: 87.5 FL
MONOCYTES # BLD AUTO: 0.62 X10(3) UL (ref 0.1–1)
MONOCYTES NFR BLD AUTO: 4.8 %
NEUTROPHILS # BLD AUTO: 10.27 X10 (3) UL (ref 1.5–7.7)
NEUTROPHILS # BLD AUTO: 10.27 X10(3) UL (ref 1.5–7.7)
NEUTROPHILS NFR BLD AUTO: 80.4 %
OSMOLALITY SERPL CALC.SUM OF ELEC: 289 MOSM/KG (ref 275–295)
PLATELET # BLD AUTO: 258 10(3)UL (ref 150–450)
POTASSIUM SERPL-SCNC: 4.2 MMOL/L (ref 3.5–5.1)
PROT SERPL-MCNC: 6.4 G/DL (ref 5.7–8.2)
RBC # BLD AUTO: 4.08 X10(6)UL
SODIUM SERPL-SCNC: 138 MMOL/L (ref 136–145)
TROPONIN I SERPL HS-MCNC: 23 NG/L
WBC # BLD AUTO: 12.8 X10(3) UL (ref 4–11)

## 2025-02-15 PROCEDURE — 80053 COMPREHEN METABOLIC PANEL: CPT | Performed by: EMERGENCY MEDICINE

## 2025-02-15 PROCEDURE — PSEU8306 VITAMIN B12: Performed by: CLINICAL MEDICAL LABORATORY

## 2025-02-15 PROCEDURE — 93010 ELECTROCARDIOGRAM REPORT: CPT

## 2025-02-15 PROCEDURE — 82607 VITAMIN B-12: CPT | Performed by: CLINICAL MEDICAL LABORATORY

## 2025-02-15 PROCEDURE — PSEU8260 FOLATE: Performed by: CLINICAL MEDICAL LABORATORY

## 2025-02-15 PROCEDURE — 84484 ASSAY OF TROPONIN QUANT: CPT | Performed by: EMERGENCY MEDICINE

## 2025-02-15 PROCEDURE — 82746 ASSAY OF FOLIC ACID SERUM: CPT | Performed by: CLINICAL MEDICAL LABORATORY

## 2025-02-15 PROCEDURE — 83735 ASSAY OF MAGNESIUM: CPT | Performed by: EMERGENCY MEDICINE

## 2025-02-15 PROCEDURE — 96365 THER/PROPH/DIAG IV INF INIT: CPT

## 2025-02-15 PROCEDURE — 83605 ASSAY OF LACTIC ACID: CPT | Performed by: EMERGENCY MEDICINE

## 2025-02-15 PROCEDURE — 96375 TX/PRO/DX INJ NEW DRUG ADDON: CPT

## 2025-02-15 PROCEDURE — 85025 COMPLETE CBC W/AUTO DIFF WBC: CPT | Performed by: EMERGENCY MEDICINE

## 2025-02-15 PROCEDURE — 87040 BLOOD CULTURE FOR BACTERIA: CPT | Performed by: EMERGENCY MEDICINE

## 2025-02-15 PROCEDURE — 87641 MR-STAPH DNA AMP PROBE: CPT | Performed by: EMERGENCY MEDICINE

## 2025-02-15 PROCEDURE — 93005 ELECTROCARDIOGRAM TRACING: CPT

## 2025-02-15 PROCEDURE — 99285 EMERGENCY DEPT VISIT HI MDM: CPT

## 2025-02-15 PROCEDURE — 71045 X-RAY EXAM CHEST 1 VIEW: CPT | Performed by: EMERGENCY MEDICINE

## 2025-02-15 RX ORDER — DEXAMETHASONE SODIUM PHOSPHATE 10 MG/ML
10 INJECTION, SOLUTION INTRAMUSCULAR; INTRAVENOUS ONCE
Status: COMPLETED | OUTPATIENT
Start: 2025-02-15 | End: 2025-02-15

## 2025-02-16 ENCOUNTER — LAB REQUISITION (OUTPATIENT)
Dept: LAB | Age: 89
End: 2025-02-16
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
## 2025-02-16 PROBLEM — J18.9 NOSOCOMIAL PNEUMONIA: Status: ACTIVE | Noted: 2025-02-16

## 2025-02-16 PROBLEM — R09.02 HYPOXEMIA: Status: ACTIVE | Noted: 2025-02-16

## 2025-02-16 LAB
BASOPHILS # BLD AUTO: 0.01 X10(3) UL (ref 0–0.2)
BASOPHILS NFR BLD AUTO: 0.1 %
CHOLEST SERPL-MCNC: 102 MG/DL (ref ?–200)
DEPRECATED RDW RBC AUTO: 53.2 FL (ref 35.1–46.3)
EOSINOPHIL # BLD AUTO: 0.01 X10(3) UL (ref 0–0.7)
EOSINOPHIL NFR BLD AUTO: 0.1 %
ERYTHROCYTE [DISTWIDTH] IN BLOOD BY AUTOMATED COUNT: 16.3 % (ref 11–15)
EST. AVERAGE GLUCOSE BLD GHB EST-MCNC: 126 MG/DL (ref 68–126)
FOLATE SERPL-MCNC: 11.2 NG/ML
GLUCOSE BLDC GLUCOMTR-MCNC: 102 MG/DL (ref 70–99)
GLUCOSE BLDC GLUCOMTR-MCNC: 199 MG/DL (ref 70–99)
HBA1C MFR BLD: 6 % (ref ?–5.7)
HCT VFR BLD AUTO: 36 %
HDLC SERPL-MCNC: 16 MG/DL (ref 40–59)
HGB BLD-MCNC: 11.4 G/DL
IMM GRANULOCYTES # BLD AUTO: 0.07 X10(3) UL (ref 0–1)
IMM GRANULOCYTES NFR BLD: 0.6 %
LDLC SERPL CALC-MCNC: 66 MG/DL (ref ?–100)
LYMPHOCYTES # BLD AUTO: 0.6 X10(3) UL (ref 1–4)
LYMPHOCYTES NFR BLD AUTO: 5.2 %
MAGNESIUM SERPL-MCNC: 2.1 MG/DL (ref 1.6–2.6)
MCH RBC QN AUTO: 28.4 PG (ref 26–34)
MCHC RBC AUTO-ENTMCNC: 31.7 G/DL (ref 31–37)
MCV RBC AUTO: 89.8 FL
MONOCYTES # BLD AUTO: 0.11 X10(3) UL (ref 0.1–1)
MONOCYTES NFR BLD AUTO: 0.9 %
MRSA DNA SPEC QL NAA+PROBE: NEGATIVE
NEUTROPHILS # BLD AUTO: 10.81 X10 (3) UL (ref 1.5–7.7)
NEUTROPHILS # BLD AUTO: 10.81 X10(3) UL (ref 1.5–7.7)
NEUTROPHILS NFR BLD AUTO: 93.1 %
NONHDLC SERPL-MCNC: 86 MG/DL (ref ?–130)
PHOSPHATE SERPL-MCNC: 3.6 MG/DL (ref 2.4–5.1)
PLATELET # BLD AUTO: 254 10(3)UL (ref 150–450)
Q-T INTERVAL: 426 MS
QRS DURATION: 140 MS
QTC CALCULATION (BEZET): 462 MS
R AXIS: -37 DEGREES
RBC # BLD AUTO: 4.01 X10(6)UL
T AXIS: 56 DEGREES
TRIGL SERPL-MCNC: 104 MG/DL (ref 30–149)
VENTRICULAR RATE: 71 BPM
VIT B12 SERPL-MCNC: 331 PG/ML (ref 211–911)
VLDLC SERPL CALC-MCNC: 16 MG/DL (ref 0–30)
WBC # BLD AUTO: 11.6 X10(3) UL (ref 4–11)

## 2025-02-16 PROCEDURE — 83735 ASSAY OF MAGNESIUM: CPT | Performed by: INTERNAL MEDICINE

## 2025-02-16 PROCEDURE — 80061 LIPID PANEL: CPT | Performed by: INTERNAL MEDICINE

## 2025-02-16 PROCEDURE — 97166 OT EVAL MOD COMPLEX 45 MIN: CPT

## 2025-02-16 PROCEDURE — 96375 TX/PRO/DX INJ NEW DRUG ADDON: CPT

## 2025-02-16 PROCEDURE — 97162 PT EVAL MOD COMPLEX 30 MIN: CPT

## 2025-02-16 PROCEDURE — 92610 EVALUATE SWALLOWING FUNCTION: CPT

## 2025-02-16 PROCEDURE — 97530 THERAPEUTIC ACTIVITIES: CPT

## 2025-02-16 PROCEDURE — 85025 COMPLETE CBC W/AUTO DIFF WBC: CPT | Performed by: INTERNAL MEDICINE

## 2025-02-16 PROCEDURE — 84100 ASSAY OF PHOSPHORUS: CPT | Performed by: INTERNAL MEDICINE

## 2025-02-16 PROCEDURE — 82962 GLUCOSE BLOOD TEST: CPT

## 2025-02-16 PROCEDURE — 83036 HEMOGLOBIN GLYCOSYLATED A1C: CPT | Performed by: INTERNAL MEDICINE

## 2025-02-16 PROCEDURE — XW033E5 INTRODUCTION OF REMDESIVIR ANTI-INFECTIVE INTO PERIPHERAL VEIN, PERCUTANEOUS APPROACH, NEW TECHNOLOGY GROUP 5: ICD-10-PCS | Performed by: STUDENT IN AN ORGANIZED HEALTH CARE EDUCATION/TRAINING PROGRAM

## 2025-02-16 RX ORDER — DOXEPIN HYDROCHLORIDE 50 MG/1
1 CAPSULE ORAL DAILY
Status: DISCONTINUED | OUTPATIENT
Start: 2025-02-16 | End: 2025-02-18

## 2025-02-16 RX ORDER — DEXTROSE MONOHYDRATE 25 G/50ML
50 INJECTION, SOLUTION INTRAVENOUS
Status: DISCONTINUED | OUTPATIENT
Start: 2025-02-16 | End: 2025-02-18

## 2025-02-16 RX ORDER — NYSTATIN 100000 U/G
CREAM TOPICAL 2 TIMES DAILY
Status: DISCONTINUED | OUTPATIENT
Start: 2025-02-16 | End: 2025-02-18

## 2025-02-16 RX ORDER — ASCORBIC ACID 500 MG
500 TABLET ORAL DAILY
Status: DISCONTINUED | OUTPATIENT
Start: 2025-02-16 | End: 2025-02-18

## 2025-02-16 RX ORDER — FERROUS SULFATE 325(65) MG
325 TABLET, DELAYED RELEASE (ENTERIC COATED) ORAL DAILY
Status: DISCONTINUED | OUTPATIENT
Start: 2025-02-16 | End: 2025-02-18

## 2025-02-16 RX ORDER — PANTOPRAZOLE SODIUM 40 MG/1
40 TABLET, DELAYED RELEASE ORAL
Status: DISCONTINUED | OUTPATIENT
Start: 2025-02-16 | End: 2025-02-18

## 2025-02-16 RX ORDER — MV-MIN/FA/VIT K/LUTEIN/ZEAXANT 200MCG-5MG
1 CAPSULE ORAL 2 TIMES DAILY
Status: DISCONTINUED | OUTPATIENT
Start: 2025-02-16 | End: 2025-02-16

## 2025-02-16 RX ORDER — FINASTERIDE 5 MG/1
5 TABLET, FILM COATED ORAL DAILY
Status: DISCONTINUED | OUTPATIENT
Start: 2025-02-16 | End: 2025-02-18

## 2025-02-16 RX ORDER — LISINOPRIL 2.5 MG/1
2.5 TABLET ORAL DAILY
Status: DISCONTINUED | OUTPATIENT
Start: 2025-02-16 | End: 2025-02-16

## 2025-02-16 RX ORDER — CLOPIDOGREL BISULFATE 75 MG/1
75 TABLET ORAL DAILY
Status: DISCONTINUED | OUTPATIENT
Start: 2025-02-16 | End: 2025-02-18

## 2025-02-16 RX ORDER — DIPHENHYDRAMINE HCL 25 MG
25 CAPSULE ORAL EVERY 4 HOURS PRN
Status: DISCONTINUED | OUTPATIENT
Start: 2025-02-16 | End: 2025-02-18

## 2025-02-16 RX ORDER — BUDESONIDE 0.5 MG/2ML
0.5 INHALANT ORAL DAILY PRN
Status: DISCONTINUED | OUTPATIENT
Start: 2025-02-16 | End: 2025-02-18

## 2025-02-16 RX ORDER — HEPARIN SODIUM 5000 [USP'U]/ML
5000 INJECTION, SOLUTION INTRAVENOUS; SUBCUTANEOUS EVERY 12 HOURS SCHEDULED
Status: DISCONTINUED | OUTPATIENT
Start: 2025-02-16 | End: 2025-02-16

## 2025-02-16 RX ORDER — ACETAMINOPHEN 500 MG
500 TABLET ORAL EVERY 4 HOURS PRN
Status: DISCONTINUED | OUTPATIENT
Start: 2025-02-16 | End: 2025-02-16

## 2025-02-16 RX ORDER — MAGNESIUM HYDROXIDE/ALUMINUM HYDROXICE/SIMETHICONE 120; 1200; 1200 MG/30ML; MG/30ML; MG/30ML
15 SUSPENSION ORAL EVERY 6 HOURS PRN
Status: DISCONTINUED | OUTPATIENT
Start: 2025-02-16 | End: 2025-02-18

## 2025-02-16 RX ORDER — SENNOSIDES 8.6 MG
17.2 TABLET ORAL 2 TIMES DAILY
Status: DISCONTINUED | OUTPATIENT
Start: 2025-02-16 | End: 2025-02-18

## 2025-02-16 RX ORDER — HEPARIN SODIUM 5000 [USP'U]/ML
5000 INJECTION, SOLUTION INTRAVENOUS; SUBCUTANEOUS EVERY 12 HOURS SCHEDULED
Status: DISCONTINUED | OUTPATIENT
Start: 2025-02-16 | End: 2025-02-18

## 2025-02-16 RX ORDER — NICOTINE POLACRILEX 4 MG
30 LOZENGE BUCCAL
Status: DISCONTINUED | OUTPATIENT
Start: 2025-02-16 | End: 2025-02-18

## 2025-02-16 RX ORDER — ACETAMINOPHEN 325 MG/1
650 TABLET ORAL EVERY 6 HOURS PRN
Status: DISCONTINUED | OUTPATIENT
Start: 2025-02-16 | End: 2025-02-18

## 2025-02-16 RX ORDER — ATORVASTATIN CALCIUM 20 MG/1
20 TABLET, FILM COATED ORAL NIGHTLY
Status: DISCONTINUED | OUTPATIENT
Start: 2025-02-16 | End: 2025-02-18

## 2025-02-16 RX ORDER — TERAZOSIN 5 MG/1
5 CAPSULE ORAL DAILY
Status: DISCONTINUED | OUTPATIENT
Start: 2025-02-16 | End: 2025-02-18

## 2025-02-16 RX ORDER — MAGNESIUM OXIDE 400 MG/1
400 TABLET ORAL DAILY
Status: DISCONTINUED | OUTPATIENT
Start: 2025-02-16 | End: 2025-02-18

## 2025-02-16 RX ORDER — NICOTINE POLACRILEX 4 MG
15 LOZENGE BUCCAL
Status: DISCONTINUED | OUTPATIENT
Start: 2025-02-16 | End: 2025-02-18

## 2025-02-16 RX ORDER — POLYETHYLENE GLYCOL 3350 17 G/17G
17 POWDER, FOR SOLUTION ORAL 2 TIMES DAILY
Status: DISCONTINUED | OUTPATIENT
Start: 2025-02-16 | End: 2025-02-18

## 2025-02-16 NOTE — ED PROVIDER NOTES
Patient Seen in: Health system Emergency Department    History     Chief Complaint   Patient presents with    Covid     Tested positive for covid at nursing home. Pt needed to be placed on oxygen, currenlty on 5L nasal cannula. Pt is a DNR       HPI    89-year-old male with history hypertension, diabetes previous stroke who is ANO x 1-2 at baseline who was sent here today because he tested positive for COVID today and is now requiring oxygen for cannot be managed at the nursing facility he lives at.  Patient currently is on 5 L nasal cannula keeping his oxygen above 95%.  All history from EMS, nursing home records and previous chart reviews.    History reviewed.   Past Medical History:    Arthritis    BPH (benign prostatic hyperplasia)    Colitis    Heart disease    High blood pressure    High cholesterol    Osteoarthritis    TIA (transient ischemic attack)       History reviewed.   Past Surgical History:   Procedure Laterality Date    Cardiac valve surgery      Aortic          Medications :  Prescriptions Prior to Admission[1]     No family history on file.    Smoking Status:   Social History     Socioeconomic History    Marital status:    Tobacco Use    Smoking status: Former     Current packs/day: 0.00     Types: Cigarettes     Quit date: 1988     Years since quittin.1       Constitutional and vital signs reviewed.      Social History and Family History elements reviewed from today, pertinent positives to the presenting problem noted.    Physical Exam     ED Triage Vitals [02/15/25 2100]   /88   Pulse 89   Resp 20   Temp 98.1 °F (36.7 °C)   Temp src Oral   SpO2 97 %   O2 Device Nasal cannula       All measures to prevent infection transmission during my interaction with the patient were taken. Handwashing was performed prior to and after the exam.  Stethoscope and any equipment used during my examination was cleaned with super sani-cloth germicidal wipes following the exam.      Physical Exam  Vitals and nursing note reviewed.   Cardiovascular:      Rate and Rhythm: Rhythm irregular.      Pulses: Normal pulses.   Pulmonary:      Effort: Pulmonary effort is normal.   Skin:     General: Skin is warm and dry.   Neurological:      Mental Status: He is alert.         ED Course        Labs Reviewed   CBC WITH DIFFERENTIAL WITH PLATELET - Abnormal; Notable for the following components:       Result Value    WBC 12.8 (*)     HGB 11.4 (*)     HCT 35.7 (*)     RDW-SD 52.6 (*)     RDW 16.4 (*)     Neutrophil Absolute Prelim 10.27 (*)     Neutrophil Absolute 10.27 (*)     All other components within normal limits   COMP METABOLIC PANEL (14) - Abnormal; Notable for the following components:    Calcium, Total 8.2 (*)     eGFR-Cr 57 (*)     All other components within normal limits   MAGNESIUM - Normal   TROPONIN I HIGH SENSITIVITY - Normal   LACTIC ACID, PLASMA   RAINBOW DRAW LAVENDER   RAINBOW DRAW LIGHT GREEN   RAINBOW DRAW GOLD   BLOOD CULTURE   BLOOD CULTURE   ED/MRSA SCREEN BY PCR-CC     EKG    Rate, intervals and axes as noted on EKG Report.  Rate: 71  Rhythm: Atrial Fibrillation  Reading: Atrial fibrillation, heart rate 71, no ND interval, left axis deviation           As Interpreted by me    Imaging Results Available and Reviewed while in ED: No results found.  ED Medications Administered:   Medications   remdesivir (Veklury) 200 mg in sodium chloride 0.9% 100 mL IVPB (has no administration in time range)   vancomycin (Vancocin) 2.25 g in sodium chloride 0.9% 500 mL IVPB premix (has no administration in time range)   ceFEPIme (Maxpime) 2 g in sodium chloride 0.9% 100 mL IVPB-MBP (2 g Intravenous New Bag 2/15/25 2311)   dexAMETHasone PF (Decadron) 10 MG/ML injection 10 mg (10 mg Intravenous Given 2/15/25 2115)         MDM     Vitals:    02/15/25 2100 02/15/25 2130 02/15/25 2245   BP: 153/88 101/53 119/68   Pulse: 89 53 78   Resp: 20 22 21   Temp: 98.1 °F (36.7 °C)     TempSrc: Oral     SpO2:  97% 99% 96%   Weight: 90.7 kg     Height: 182.9 cm (6')       *I personally reviewed and interpreted all ED vitals.    Pulse Ox: 97%, Room air, Normal     Monitor Interpretation:   atrial fibrillation, with ventricular rate of 71  as interpreted by me.  The cardiac monitor was ordered to monitor cardiac rate and rhythm.    Differential Diagnosis/ Diagnostic Considerations: COVID-pneumonia, hypoxemia, bacterial pneumonia, electrolyte abnormalities    Complicating Factors: The patient already has does not have any pertinent problems on file. to contribute to the complexity of this ED evaluation.    Medical Decision Making  Amount and/or Complexity of Data Reviewed  Independent Historian:      Details: Sisters at the bedside  External Data Reviewed: notes.     Details: Nursing home records  Labs: ordered. Decision-making details documented in ED Course.  Radiology: ordered and independent interpretation performed. Decision-making details documented in ED Course.    Risk  Decision regarding hospitalization.    Critical Care  Total time providing critical care: 30 minutes      I reviewed all results with patient's family at the bedside.  Slightly elevated WBC count otherwise nothing is acute on labs.  X-ray does show diffuse patchy infiltrates suggestive of pneumonia could be a superimposed interstitial opacities well.  Patient does have COVID most likely COVID-pneumonia but with a slightly elevated WBC count could also be a superimposed bacterial pneumonia.  Given IV steroids along with starting remdesivir treatment for the COVID.  Also sent off blood cultures, lactate and started IV antibiotics for possible superimposed bacterial pneumonia.  Patient will need to be admitted due to the hypoxemia requiring nasal cannula oxygen.  Family agrees with this plan.    I discussed case with Dr. Hewitt who accepts admission    I discussed case with Dr. Peña-infectious disease and notified of consult  Condition upon leaving the  department: Stable    Disposition and Plan     Clinical Impression:  1. COVID    2. Hypoxemia    3. Nosocomial pneumonia        Disposition:  Admit    Follow-up:  No follow-up provider specified.    Medications Prescribed:  Current Discharge Medication List          Hospital Problems       Present on Admission  Date Reviewed: 4/10/2017            ICD-10-CM Noted POA    * (Principal) COVID U07.1 2/15/2025 Unknown                       [1] (Not in a hospital admission)

## 2025-02-16 NOTE — H&P
[facility]  part of formerly [facility]    History & Physical    Louis Solitario Patient Status:  Inpatient    1/10/1929 MRN [id number]   Location [facility]5W Attending Crystal Hewitt MD   Hosp Day # 0 PCP NICK PAZ     Date:  2025  Date of Admission:  2/15/2025    History provided by:patient and nursing home medical records  Chief Complaint:     Chief Complaint   Patient presents with    Covid     Tested positive for covid at nursing home. Pt needed to be placed on oxygen, currenlty on 5L nasal cannula. Pt is a DNR       HPI:   Louis Solitario is a(n) 89 year old male.  Osteoarthritis, benign prostatic hypertrophy, primary osteoarthritis of the knee, COPD, hyperlipidemia, ulcerative colitis, GERD, coronary artery disease, COPD, GERD, who is a resident of nearby skilled nursing facility.  Patient at the nursing facility was being treated for COVID-19 with the Paxlovid.  And the patient was having increasing oxygen requirements.  And patient was sent to the emergency room for further evaluation and treatment.  Patient currently complains of bodyaches.  And Tylenol is not helping him.  Denies any fever or chills.  patient not a reliable historian.      History     Past Medical History:    Arthritis    BPH (benign prostatic hyperplasia)    Colitis    Heart disease    High blood pressure    High cholesterol    Osteoarthritis    TIA (transient ischemic attack)     Past Surgical History:   Procedure Laterality Date    Cardiac valve surgery      Aortic      No family history on file.  Social History:  Social History     Socioeconomic History    Marital status:    Tobacco Use    Smoking status: Former     Current packs/day: 0.00     Types: Cigarettes     Quit date: 1988     Years since quittin.1     Social Drivers of Health     Food Insecurity: Patient Declined (2025)    NCSS - Food Insecurity     Worried About Running Out of Food in the Last Year: Patient declined      Ran Out of Food in the Last Year: Patient declined   Transportation Needs: Patient Declined (2/16/2025)    NCSS - Transportation     Lack of Transportation: Patient declined   Housing Stability: Patient Declined (2/16/2025)    NCSS - Housing/Utilities     Has Housing: Patient declined     Worried About Losing Housing: Patient declined     Unable to Get Utilities: Patient declined     Allergies/Medications:   Allergies: Allergies[1]  Medications Prior to Admission   Medication Sig    NIRMATRELVIR&RITONAVIR 150/100 OR Take 2 tablets by mouth in the morning and 2 tablets before bedtime.    Clopidogrel Bisulfate 75 MG Oral Tab Take 1 tablet (75 mg total) by mouth daily.    finasteride 5 MG Oral Tab Take 1 tablet (5 mg total) by mouth daily.    magnesium oxide 400 MG Oral Tab Take 420 mg by mouth daily.    mesalamine 400 MG Oral Capsule Delayed Release Take 3 capsules (1,200 mg total) by mouth 4 (four) times daily. Takes two tablets once daily    omeprazole 20 MG Oral Capsule Delayed Release Take 1 capsule (20 mg total) by mouth every morning before breakfast.    simvastatin 40 MG Oral Tab Take 2 tablets (80 mg total) by mouth daily.    Terazosin HCl 5 MG Oral Cap Take 1 capsule (5 mg total) by mouth daily.    traMADol 50 MG Oral Tab Take 1 tablet (50 mg total) by mouth 3 (three) times daily as needed for Pain.    sennosides 17.2 MG Oral Tab Take 1 tablet (17.2 mg total) by mouth 2 (two) times daily.    lidocaine-menthol 4-1 % External Patch Place 1 patch onto the skin daily.    ketoconazole 2 % External Shampoo Apply 120 mL topically twice a week.    Polyethylene Glycol 3350 17 g Oral Powd Pack Take 17 g by mouth in the morning and 17 g before bedtime.    ipratropium-albuterol 0.5-2.5 (3) MG/3ML Inhalation Solution Take 3 mL by nebulization 2 (two) times daily as needed.    budesonide (PULMICORT) 0.5 MG/2ML Inhalation Suspension Take 2 mL (0.5 mg total) by nebulization daily as needed (shortness of breath,  wheezing).    diphenhydrAMINE 25 MG Oral Cap Take 1 capsule (25 mg total) by mouth every 4 (four) hours as needed for Itching.    nystatin 100,000 Units/g External Cream Apply topically 2 (two) times daily.    tacrolimus 0.1 % External Ointment Apply topically 2 (two) times daily. Apply small amount topically BID to affected area of penis and groin    pimecrolimus 1 % External Cream Apply topically 2 (two) times daily. Apply small amount topically BID, apply thin layer to affected are in groin    ascorbic acid 500 MG Oral Tab Take 1 tablet (500 mg total) by mouth daily.    ferrous sulfate 325 (65 FE) MG Oral Tab EC Take 1 tablet (325 mg total) by mouth daily.    alum-mag hydroxide-simethicone (ANTACID) 200-200-20 MG/5ML Oral Suspension Take 15 mL by mouth every 6 (six) hours as needed for Indigestion. Every 6 hours as needed for heartburn    Multiple Vitamins-Minerals (PRESERVISION AREDS 2+MULTI VIT) Oral Cap Take 1 capsule by mouth in the morning and 1 capsule before bedtime.    Acetaminophen  MG Oral Tab CR Take 1 tablet (650 mg total) by mouth every 6 (six) hours as needed for Pain.    Carboxymethylcellulose Sodium 1 % Ophthalmic Solution Place 1 drop into both eyes 4 (four) times daily as needed. 1 drop both eyes as needed four times daily    Vitamin D3 2000 units Oral Cap Take 1 capsule (2,000 Units total) by mouth daily.    lisinopril 5 MG Oral Tab Take 0.5 tablets (2.5 mg total) by mouth daily.    multivitamin Oral Tab Take 1 tablet by mouth daily.       Review of Systems:   Pertinent items are noted in HPI.  Could not be obtained in detail because of the patient dementia and the patient clinical condition  Physical Exam:   Vital Signs:  Blood pressure 135/76, pulse 70, temperature 97.2 °F (36.2 °C), temperature source Oral, resp. rate 24, height 6' (1.829 m), weight 156 lb 9.6 oz (71 kg), SpO2 98%.  Patient  Comfortably in bed.  Vital signs are noted.  HEENT: Pallor noted  Neck no JVD no carotid  bruit.  Heart S1 and S2 regular in rate and rhythm.  The lungs are clear to auscultation anteriorly  Abdomen is soft bowel sounds are present  Extremities no pedal edema  CNS examination patient is a conscious and alert.            Results:     Lab Results   Component Value Date    WBC 11.6 (H) 02/16/2025    HGB 11.4 (L) 02/16/2025    HCT 36.0 (L) 02/16/2025    .0 02/16/2025    CREATSERUM 1.17 02/15/2025    BUN 22 02/15/2025     02/15/2025    K 4.2 02/15/2025     02/15/2025    CO2 23.0 02/15/2025    GLU 94 02/15/2025    CA 8.2 (L) 02/15/2025    ALB 3.2 02/15/2025    ALKPHO 98 02/15/2025    BILT 0.4 02/15/2025    TP 6.4 02/15/2025    AST 31 02/15/2025    ALT 11 02/15/2025    TSH 3.744 10/29/2024    MG 2.1 02/16/2025    PHOS 3.6 02/16/2025       XR CHEST AP PORTABLE  (CPT=71045)    Result Date: 2/16/2025  CONCLUSION:   Bilateral perihilar and basilar predominant interstitial opacities throughout both lungs.  When considering clinical context, findings raise suspicion for viral pneumonia.  Cardiomegaly.  Sternotomy with coronary artery bypass.   A preliminary report was issued by the [facility] teleradiology service. There are no major discrepancies.  elm-remote  Dictated by (CST): Román Johnson MD on 2/16/2025 at 6:07 AM     Finalized by (CST): Román Johnson MD on 2/16/2025 at 6:08 AM         EKG 12 Lead    Result Date: 2/16/2025  Atrial fibrillation with premature ventricular or aberrantly conducted complexes Left axis deviation Right bundle branch block Abnormal ECG No previous ECGs found in [facility]     Assessment/Plan:   Acute hypoxic respiratory failure secondary to acute COVID-19 infection.    Continue patient on oxygen.  Continue patient on remdesivir and steroids.      Acute COVID-19 infection is a failure of Paxlovid therapy.  Patient has been seen by infectious disease physician continue remdesivir continue steroids.      Coronary artery disease stable.  Patient denies any chest  pain.    Continue him on the Plavix.  Ulcerative colitis.  Patient is stable denies any diarrhea.  Will continue his nursing home medications.  GERD continue with the Protonix.    Hyperlipidemia continue simvastatin.  Benign prostatic hypertrophy continue terazosin.    Reviewed personally: current medical record, vital signs info, lab results, cardiac & radiographic films and reports.  Formulated plans for continued care for this patient.  RN to call us for any significant change  Scheduled tests: see orders   Other orders per treatment team.     *The above components were done for the patient encounter: Reviewing the patient's electronic chart, reviewing results, obtaining a medical history, counseling patient and/or family member, ordering medications, tests, or procedures, documenting clinical information in the electronic health record, refer to or communicate with other health care professionals as indicated, independently interpret results and providing care coordination        Active Orders   Nourishments    Room Service Eligibility Until Discontinued     Frequency: Until Discontinued     Number of Occurrences: Until Specified    Room Service Notify RN Until Discontinued     Frequency: Until Discontinued     Number of Occurrences: Until Specified   OT    OT eval and treat     Frequency: Once     Number of Occurrences: 1 Occurrences   PT    PT eval and treat     Frequency: Once     Number of Occurrences: 1 Occurrences   SLP    SLP eval and treat     Frequency: Once     Number of Occurrences: 1 Occurrences   Diet    Cardiac diet Cardiac; Texture Consistency: Chopped / Soft & Bite Sized; Is Patient on Accuchecks? No     Frequency: Effective Now     Number of Occurrences: Until Specified   Nursing    Ambulate patient TID     Frequency: TID     Number of Occurrences: Until Specified     Order Comments: In osuna, with assistive device      Cardiac monitoring     Frequency: Until Discontinued     Number of  Occurrences: Until Specified    Cardiac monitoring     Frequency: Until Discontinued     Number of Occurrences: Until Specified    Initiate electrolyte protocol     Frequency: Until Discontinued     Number of Occurrences: Until Specified    Initiate Oxygen Protocol     Frequency: Until Discontinued     Number of Occurrences: Until Specified    Insert Peripheral IV     Frequency: Once     Number of Occurrences: 1 Occurrences    Insert/Maintain PIV     Frequency: Once     Number of Occurrences: 1 Occurrences    Intake and Output     Frequency: Q Shift     Number of Occurrences: Until Specified    Intermittent Straight Cath Protocol for Acute Urinary Retention     Frequency: Until Discontinued     Number of Occurrences: Until Specified    Place sequential compression device     Frequency: Continuous     Number of Occurrences: Until Specified     Order Comments: except while ambulating.      Please communicate patient's home diabetic medications when speaking with physician     Frequency: Once     Number of Occurrences: 1 Occurrences    Provide diabetes patient education guide     Frequency: Once     Number of Occurrences: 1 Occurrences     Order Comments: Initiate education for new onset diabetes or pre-existing diabetes with knowledge deficits.      Teach blood glucose monitoring with bedside glucometer     Frequency: Once     Number of Occurrences: 1 Occurrences     Order Comments: If patient does not have a home glucometer, notify physician to order for discharge home.      Up in chair TID     Frequency: TID     Number of Occurrences: Until Specified     Order Comments: With meals      Vital signs     Frequency: Per Unit Routine     Number of Occurrences: 2 Hours     Order Comments: ED holding order only  Cancel if other admission orders exist!        Vital signs     Frequency: Per Unit Routine     Number of Occurrences: Until Specified   Consult    Consult to Infectious Disease     Frequency: Once     Number of  Occurrences: 1 Occurrences    ED Consult to Primary Care/Medicine     Frequency: Once     Number of Occurrences: 1 Occurrences    ED Consult to Primary Care/Medicine     Frequency: Once     Number of Occurrences: 1 Occurrences   Isolation    Contact and droplet isolation status Continuous     Frequency: Continuous     Number of Occurrences: 2 Hours     Order Comments: ED holding order only  Cancel if other admission orders exist!     Medications    acetaminophen (Tylenol) tab 650 mg     Frequency: Q6H PRN     Dose: 650 mg     Route: Oral    alum-mag hydroxide-simethicone (Maalox) 200-200-20 MG/5ML oral suspension 15 mL     Frequency: Q6H PRN     Dose: 15 mL     Route: Oral    ascorbic acid (Vitamin C) tab 500 mg     Frequency: Daily     Dose: 500 mg     Route: Oral    atorvastatin (Lipitor) tab 20 mg     Frequency: Nightly     Dose: 20 mg     Route: Oral    budesonide (Pulmicort) 0.5 MG/2ML nebulizer suspension 0.5 mg     Frequency: Daily PRN     Dose: 0.5 mg     Route: Nebulization    clopidogrel (Plavix) tab 75 mg     Frequency: Daily     Dose: 75 mg     Route: Oral    dexamethasone (Decadron) tab 6 mg     Frequency: Daily     Dose: 6 mg     Route: Oral    dextrose 50% injection 50 mL     Linked Order: Or     Frequency: Q15 Min PRN     Dose: 50 mL     Route: Intravenous    diphenhydrAMINE (Benadryl) cap/tab 25 mg     Frequency: Q4H PRN     Dose: 25 mg     Route: Oral    ferrous sulfate DR tab 325 mg     Frequency: Daily     Dose: 325 mg     Route: Oral    finasteride (Proscar) tab 5 mg     Frequency: Daily     Dose: 5 mg     Route: Oral    glucose (Dex4) 15 GM/59ML oral liquid 15 g     Linked Order: Or     Frequency: Q15 Min PRN     Dose: 15 g     Route: Oral    glucose (Dex4) 15 GM/59ML oral liquid 30 g     Linked Order: Or     Frequency: Q15 Min PRN     Dose: 30 g     Route: Oral    glucose (Glutose) 40% oral gel 15 g     Linked Order: Or     Frequency: Q15 Min PRN     Dose: 15 g     Route: Oral    glucose  (Glutose) 40% oral gel 30 g     Linked Order: Or     Frequency: Q15 Min PRN     Dose: 30 g     Route: Oral    glucose-vitamin C (Dex-4) chewable tab 4 tablet     Linked Order: Or     Frequency: Q15 Min PRN     Dose: 4 tablet     Route: Oral    glucose-vitamin C (Dex-4) chewable tab 8 tablet     Linked Order: Or     Frequency: Q15 Min PRN     Dose: 8 tablet     Route: Oral    glycerin-hypromellose- (Artificial Tears) 0.2-0.2-1 % ophthalmic solution 1 drop     Frequency: QID PRN     Dose: 1 drop     Route: Both Eyes    heparin (Porcine) 5000 UNIT/ML injection 5,000 Units     Frequency: Q12H NORMA     Dose: 5,000 Units     Route: Subcutaneous    magnesium oxide (Mag-Ox) tab 400 mg     Frequency: Daily     Dose: 400 mg     Route: Oral    multivitamin (Tab-A-Garry/Beta Carotene) tab 1 tablet     Frequency: Daily     Dose: 1 tablet     Route: Oral    nystatin (Mycostatin) 100,000 Units/g cream     Frequency: BID     Route: Topical    pantoprazole (Protonix) DR tab 40 mg     Frequency: QAM AC     Dose: 40 mg     Route: Oral    polyethylene glycol (PEG 3350) (Miralax) 17 g oral packet 17 g     Frequency: BID     Dose: 17 g     Route: Oral    sennosides (Senokot) tab 17.2 mg     Frequency: BID     Dose: 17.2 mg     Route: Oral    terazosin (Hytrin) cap 5 mg     Frequency: Daily     Dose: 5 mg     Route: Oral       Crystal Hewitt MD  2/16/2025          [1]   Allergies  Allergen Reactions    Adhesive Tape RASH

## 2025-02-16 NOTE — OCCUPATIONAL THERAPY NOTE
OCCUPATIONAL THERAPY EVALUATION - INPATIENT     Room Number: 507/507-A  Evaluation Date: 2/16/2025  Type of Evaluation: Initial  Presenting Problem: From [facility]-COVID, now on 5L O2    Physician Order: IP Consult to Occupational Therapy  Reason for Therapy: ADL/IADL Dysfunction and Discharge Planning    OCCUPATIONAL THERAPY ASSESSMENT   Patient is a 89 year old male admitted 2/15/2025 for COVID, increased O2 needs.  Prior to admission, patient's baseline: OT called DTR who reports pt is typically WC level at [facility] x1 A and helps w ADLS as able but staff does alot for him. [facility], Pt is now x2 A and lift for TFRS. Pt was at [facility] for 4 days prior to admission hopeful to [facility] and more engaged in self care management. Patient is currently functioning below baseline with ADLs and functional mobility/TFRs.  Patient is requiring max/total A For ADLS, x2 A for functional TFRS as a result of the following impairments: decreased functional strength, decreased functional reach, decreased endurance, pain, impaired   balance, decreased muscular endurance, medical status, limited   ROM, increased O2 needs from baseline, and visual deficits. Occupational Therapy will continue to follow for duration of hospitalization.    Patient will benefit from continued skilled OT Services to promote return to prior level of function and safety with continuous assistance and gradual rehabilitative therapy.    PLAN DURING HOSPITALIZATION  OT Device Recommendations: TBD  OT Treatment Plan: Energy conservation/work simplification techniques;Balance activities;ADL training;Functional transfer training;Endurance training;UE strengthening/ROM     OCCUPATIONAL THERAPY MEDICAL/SOCIAL HISTORY   Problem List  Principal Problem:    COVID  Active Problems:    Hypoxemia    Nosocomial pneumonia    HOME SITUATION  Type of Home: Assisted living facility  Lives With: Staff 24 hours  Drives: No  Patient Regularly Uses:  Wheelchair      SUBJECTIVE  \"My butt hurts\"    OCCUPATIONAL THERAPY EXAMINATION      OBJECTIVE  Precautions: Bed/chair alarm; Low vision (legally blind)  Fall Risk: High fall risk      PAIN ASSESSMENT  Rating: Unable to rate  Location: buttocks  Management Techniques: Body mechanics; Activity promotion; Relaxation; Repositioning      ACTIVITY TOLERANCE  Pulse: 85                    O2 SATURATIONS  Oxygen Therapy  SPO2% on Oxygen at Rest: 90  At rest oxygen flow (liters per minute): 5    COGNITION  Orientation Level:  disoriented to time    VISION  Legally blind B eyes.      Communication: hoarse voice DTR reports due to being blind and not having access to call button at rehab-- yelling out for help the last few days.       RANGE OF MOTION   Upper extremity ROM is within functional limits     STRENGTH ASSESSMENT  Upper extremity strength is grossly 3/5    COORDINATION  Gross Motor: WFL   Fine Motor: WFL     ACTIVITIES OF DAILY LIVING ASSESSMENT  AM-PAC ‘6-Clicks’ Inpatient Daily Activity Short Form  How much help from another person does the patient currently need…  -   Putting on and taking off regular lower body clothing?: Total  -   Bathing (including washing, rinsing, drying)?: A Lot  -   Toileting, which includes using toilet, bedpan or urinal? : Total  -   Putting on and taking off regular upper body clothing?: A Lot  -   Taking care of personal grooming such as brushing teeth?: A Lot  -   Eating meals?: A Lot    AM-PAC Score:  Score: 10  Approx Degree of Impairment: 74.7%  Standardized Score (AM-PAC Scale): 27.31  CMS Modifier (G-Code): CL    BED MOBILITY  Rolling: max assist  Supine to Sit: max assistx2  Sit to Supine: max assistx2  Repo in bed: max A x2  Comments:    HOB elevated, use of rail with verbal and tactile cueing    Tolerates EOB sitting 5 mins with mod A for balance-posterior lean to L side  FUNCTIONAL TRANSFER ASSESSMENT  Comments:    Not attempted due to overall safety, fatigue, no wlker in  room, Pt declining at this time.     ACTIVITIES OF DAILY LIVING  Eating: max assist  Grooming: max assist  UB Dressing: max assist  LB Dressing: total assist  Toileting: total assist  Comments:     Graded based on abilities during session and clinical judgement. Pt demos abilities with:  LB dressing    Skilled Therapy Provided: Pt seen for OT session, RN approved. See ADL and mobility grids, DC recs for details. At the end, Pt positioned upright in chair w alarm on, call light and personal items in reach, pillows offloading B hips, soft PRAFOS donned, Pt trialed and demo'd ability to manage call button and was left clipped to gown,  RN Aware.       EDUCATION PROVIDED  Patient Education : Role of Occupational Therapy; Plan of Care; Discharge Recommendations; Functional Transfer Techniques; Fall Prevention; Posture/Positioning  Patient's Response to Education: Verbalized Understanding; Returned Demonstration; Requires Further Education    The patient's Approx Degree of Impairment: 74.7% has been calculated based on documentation in the [facility] '6 clicks' Inpatient Daily Activity Short Form.  Research supports that patients with this level of impairment may benefit from rehab.  Final disposition will be made by interdisciplinary medical team.     Patient End of Session: In bed;Needs met;Call light within reach;RN aware of session/findings;All patient questions and concerns addressed;Alarm set    OT Goals  Patients self stated goal is: get stronger at rehab to be back to baseline     Patient will roll R<>L with min A and multimodal cueing for pressure relief, inc indep w bedlevel ADLS  Comment:     Patient will complete face washing with set up A  Comment:     Patient will tolerate EOB sitting with CGA for duration of ADL  Comment:              Goals  on: 3xs/week  Frequency: 3xs/week    Patient Evaluation Complexity Level:   Occupational Profile/Medical History MODERATE - Expanded review of history including review  of medical or therapy record   Specific performance deficits impacting engagement in ADL/IADL HIGH  5+ performance deficits    Client Assessment/Performance Deficits HIGH - Comorbidities and significant modifications of tasks    Clinical Decision Making MODERATE - Analysis of occupational profile, detailed assessments, several treatment options    Overall Complexity MODERATE     OT Session Time: 25 minutes  Therapeutic Activity: 25 minutes

## 2025-02-16 NOTE — PLAN OF CARE
Problem: Impaired Cognition  Goal: Patient will exhibit improved attention, thought processing and/or memory  Description: Interventions:  - Minimize distractions in the room when full attention is required  Outcome: Progressing     Problem: SKIN/TISSUE INTEGRITY - ADULT  Goal: Skin integrity remains intact  Description: INTERVENTIONS  - Assess and document risk factors for pressure ulcer development  - Assess and document skin integrity  - Monitor for areas of redness and/or skin breakdown  - Initiate interventions, skin care algorithm/standards of care as needed  Outcome: Progressing     Problem: GENITOURINARY - ADULT  Goal: Absence of urinary retention  Description: INTERVENTIONS:  - Assess patient’s ability to void and empty bladder  - Monitor intake/output and perform bladder scan as needed  - Follow urinary retention protocol/standard of care  - Consider collaborating with pharmacy to review patient's medication profile  - Implement strategies to promote bladder emptying  Outcome: Progressing    Spoke w/ MD regarding POLST form in chart, per attending, will change order for DNAR

## 2025-02-16 NOTE — SLP NOTE
ADULT SWALLOWING EVALUATION    ASSESSMENT    ASSESSMENT/OVERALL IMPRESSION:  SLP BSSE orders received and acknowledged. A swallow evaluation warranted as pt presents with COVID-19 infection, reduced overall endurance and modified diet at baseline. Pt afebrile with weak/hoarse/breathy vocal quality, on 4L/Min, with oxygen saturation at 93%. Pt with known hx of dysphagia at [facility]. Recommended minced & moist solid texture and thin liquid consistency diet on 2/17/23.   Pt positioned upright as tolerated in bed (unable to tolerate 90*).  Oral mech reveals generalized weakness and reduced ROM, as well as edentulous. Pt with congested, wet cough at baseline secondary to COVID-19 infection. Pt reports \"ground\" diet at baseline. Accepting of PO trials thin, puree and soft solid texture. Pt with adequate oral acceptance and bilabial seal across all trials. Pt with intact bite, prolonged mastication of solids requiring additional moisture to aid in prep/bolus formation, and timely A-P transit. Pt's swallow response appears timely with adequate hyolaryngeal elevation/excursion to palpation. No overt s/s distress observed across trials. Adequate oral clearance noted. 2/15/25 CXR indicates viral PNA in question. Oxygen status remained stable at/above 90% t/o the entire evaluation.     At this time, pt presents with mild oral dysphagia and unlikely pharyngeal dysfunction. Recommend a MINCED AND MOIST diet and THIN liquids with strict adherence to safe swallowing compensatory strategies including slow rate, upright, small bites, small single sips, alternate solids/liquids, added sauces and gravies, supervision with intake. Meds whole, 1 at a time as tolerated (with liquids/puree). Results and recommendations reviewed with RN and patient. Pt v/u to all results/recommendations. SLP adjusted MD diet orders per protocol. SLP to follow closely to ensure safety/tolerance of diet recommendations.  FCM SCORE: 5/7     RECOMMENDATIONS   Diet  Recommendations - Solids: Mechanical soft ground/ Minced & Moist  Diet Recommendations - Liquids: Thin Liquids    Compensatory Strategies Recommended: Alternate consistencies;Extra sauce/gravy  Aspiration Precautions: Upright position;Small bites;Slow rate;Small sips  Medication Administration Recommendations: One pill at a time (as tolerated with water/puree)  Treatment Plan/Recommendations: Aspiration precautions    HISTORY   MEDICAL HISTORY  Reason for Referral: Altered diet consistency    Problem List  Principal Problem:    COVID  Active Problems:    Hypoxemia    Nosocomial pneumonia    Past Medical History  Past Medical History:    Arthritis    BPH (benign prostatic hyperplasia)    Colitis    Heart disease    High blood pressure    High cholesterol    Osteoarthritis    TIA (transient ischemic attack)     Prior Living Situation: Unknown  Diet Prior to Admission: Mechanical soft ground/ Minced & Moist (per pt report \"my food should be ground\")  Precautions: Aspiration    Patient/Family Goals: n/a    SWALLOWING HISTORY  Current Diet Consistency: Mechanical soft chopped/ Soft & Bite Sized;Thin liquids  Dysphagia History: 2/17/23 - minced & moist / thin  Imaging Results:     CXR 2/15/25 CONCLUSION:   Bilateral perihilar and basilar predominant interstitial opacities throughout both lungs.  When considering clinical context, findings raise suspicion for viral pneumonia.   Cardiomegaly.  Sternotomy with coronary artery bypass.   A preliminary report was issued by the [facility] teleradiology service. There are no major discrepancies.   elm-remote      Dictated by (CST): Román Johnson MD on 2/16/2025 at 6:07 AM       Finalized by (CST): Román Johnson MD on 2/16/2025 at 6:08 AM       OBJECTIVE   ORAL MOTOR EXAMINATION  Dentition: Edentulous  Symmetry: Within Functional Limits  Strength: Overall reduced     Range of Motion: Overall reduced  Rate of Motion: Reduced    Voice Quality:  Weak;Breathy;Hoarse  Respiratory Status: Supplemental O2;Nasal cannula (4L)  Consistencies Trialed: Thin liquids;Puree;Soft solid  Method of Presentation: Staff/Clinician assistance;Spoon;Cup;Straw;Single sips  Patient Positioned: Upright;Midline    Oral Phase of Swallow: Impaired  Bolus Retrieval: Intact  Bilabial Seal: Intact  Bolus Formation: Impaired  Bolus Propulsion: Intact  Mastication: Impaired  Retention: Intact    Pharyngeal Phase of Swallow: Within Functional Limits     (Please note: Silent aspiration cannot be evaluated clinically. Videofluoroscopic Swallow Study is required to rule-out silent aspiration.)    Esophageal Phase of Swallow: No complaints consistent with possible esophageal involvement    GOALS  Goal #1 The patient will tolerate MINCED & MOIST consistency and THIN  liquids without overt signs or symptoms of aspiration with 100 % accuracy over 1-2 session(s).    In Progress   Goal #2 The patient will utilize compensatory strategies as outlined by  BSSE (clinical evaluation) including Slow rate, Small bites, Small sips, Alternate liquids/solids, Upright 90 degrees, Supervision with meals, ADDED SAUCES/GRAVIES with PRN assistance 100 % of the time across 2 sessions.    In Progress     FOLLOW UP  Treatment Plan/Recommendations: Aspiration precautions  Duration: 1 week  Follow Up Needed (Documentation Required): Yes  SLP Follow-up Date: 02/17/25    Thank you for your referral.   If you have any questions, please contact KARMA Robert.    Rosaura Kerr MS CCC-SLP/L  Speech Language Pathologist  EXT [phone]

## 2025-02-16 NOTE — ED QUICK NOTES
Orders for admission, patient is aware of plan and ready to go upstairs. Any questions, please call ED RN Vielka at extension [phone].     Patient Covid vaccination status: Unvaccinated     COVID Test Ordered in ED: None    COVID Suspicion at Admission: N/A    Running Infusions:      Mental Status/LOC at time of transport: a&ox1 (baseline)    Other pertinent information: pt is blind   CIWA score: N/A   NIH score:  N/A

## 2025-02-16 NOTE — CONSULTS
[facility]  part of [facility] ID CONSULT NOTE    Louis Solitario Patient Status:  Inpatient    1/10/1929 MRN [id number]   Location [facility]5W Attending Crystal Hewitt MD   Hosp Day # 0 PCP NICK PAZ       Reason for Consultation:  COVID-19 pna    Antibiotics: none  (Vanc, cefepime in ED)    ASSESSMENT:    # COVID-19 pneumonia with hypoxia  # acute hypoxic resp failure on 5L NC  # advanced age  # HTN, HLD, BPH    PLAN:    -  continue RDV day 2  as well as steroids.  -  monitor off abx  -  supportive care, wean off O2 as tolerated  -  isolation precaution  -  Follow fever curve, wbc  -  Reviewed labs, micro, imaging reports, available old records    Thank you for allowing us to participate in the care of this patient. Please do not hesitate to call if you have any questions.   We will continue to follow with you and will make further recommendations based on his progress.    History of Present Illness:  Louis Solitario is a a(n) 89 year old male who is a nursing home resident with past medical history of BPH, hypertension, hyperlipidemia was sent to ED for positive COVID test with hypoxia.  Tested positive for COVID at the nursing facility on 2/15/2025.  He was requiring 5 L nasal cannula on admission.  Denies any SOB or chest pain. States he feels cold but denies any pain. Afebrile with WBC 11.6.  Lactate normal.   Chest x-ray with bilateral interstitial opacities. Started on Vanco, cefepime, remdesivir as well as steroids.     History:  Past Medical History:    Arthritis    BPH (benign prostatic hyperplasia)    Colitis    Heart disease    High blood pressure    High cholesterol    Osteoarthritis    TIA (transient ischemic attack)     Past Surgical History:   Procedure Laterality Date    Cardiac valve surgery      Aortic      No family history on file.   reports that he quit smoking about 37 years ago. He does not have any smokeless tobacco history on  file.    Allergies:  Allergies[1]    Medications:    Current Facility-Administered Medications:     acetaminophen (Tylenol) tab 650 mg, 650 mg, Oral, Q6H PRN    alum-mag hydroxide-simethicone (Maalox) 200-200-20 MG/5ML oral suspension 15 mL, 15 mL, Oral, Q6H PRN    ascorbic acid (Vitamin C) tab 500 mg, 500 mg, Oral, Daily    budesonide (Pulmicort) 0.5 MG/2ML nebulizer suspension 0.5 mg, 0.5 mg, Nebulization, Daily PRN    glycerin-hypromellose- (Artificial Tears) 0.2-0.2-1 % ophthalmic solution 1 drop, 1 drop, Both Eyes, QID PRN    clopidogrel (Plavix) tab 75 mg, 75 mg, Oral, Daily    diphenhydrAMINE (Benadryl) cap/tab 25 mg, 25 mg, Oral, Q4H PRN    ferrous sulfate DR tab 325 mg, 325 mg, Oral, Daily    finasteride (Proscar) tab 5 mg, 5 mg, Oral, Daily    magnesium oxide (Mag-Ox) tab 400 mg, 400 mg, Oral, Daily    multivitamin (Tab-A-Garry/Beta Carotene) tab 1 tablet, 1 tablet, Oral, Daily    nystatin (Mycostatin) 100,000 Units/g cream, , Topical, BID    pantoprazole (Protonix) DR tab 40 mg, 40 mg, Oral, QAM AC    polyethylene glycol (PEG 3350) (Miralax) 17 g oral packet 17 g, 17 g, Oral, BID    sennosides (Senokot) tab 17.2 mg, 17.2 mg, Oral, BID    atorvastatin (Lipitor) tab 20 mg, 20 mg, Oral, Nightly    terazosin (Hytrin) cap 5 mg, 5 mg, Oral, Daily    glucose (Dex4) 15 GM/59ML oral liquid 15 g, 15 g, Oral, Q15 Min PRN **OR** glucose (Glutose) 40% oral gel 15 g, 15 g, Oral, Q15 Min PRN **OR** glucose-vitamin C (Dex-4) chewable tab 4 tablet, 4 tablet, Oral, Q15 Min PRN **OR** dextrose 50% injection 50 mL, 50 mL, Intravenous, Q15 Min PRN **OR** glucose (Dex4) 15 GM/59ML oral liquid 30 g, 30 g, Oral, Q15 Min PRN **OR** glucose (Glutose) 40% oral gel 30 g, 30 g, Oral, Q15 Min PRN **OR** glucose-vitamin C (Dex-4) chewable tab 8 tablet, 8 tablet, Oral, Q15 Min PRN    heparin (Porcine) 5000 UNIT/ML injection 5,000 Units, 5,000 Units, Subcutaneous, 2 times per day    dexamethasone (Decadron) tab 6 mg, 6 mg, Oral,  Daily    Review of Systems:  CONSTITUTIONAL:  weakness and fatigue.  HEENT:  Eyes:  No visual loss, blurred vision, double vision or yellow sclerae. Ears, Nose, Throat:  No hearing loss, sneezing, congestion, runny nose or sore throat.  SKIN:  No rash or itching.  CARDIOVASCULAR:  No chest pain, chest pressure or chest discomfort  RESPIRATORY:  No shortness of breath, cough or sputum.  GASTROINTESTINAL:  No anorexia, nausea, vomiting or diarrhea. No abdominal pain or blood.  GENITOURINARY:  No Burning on urination.   NEUROLOGICAL:  No headache, dizziness, syncope, paralysis, ataxia, numbness or tingling in the extremities.  MUSCULOSKELETAL:  No muscle, back pain, joint pain or stiffness.    Physical Exam:  Vital signs: Blood pressure 135/76, pulse 91, temperature 97.2 °F (36.2 °C), temperature source Oral, resp. rate 24, height 6' (1.829 m), weight 156 lb 9.6 oz (71 kg), SpO2 94%.    General: Alert, oriented, NAD, on 5L NC  HEENT: Moist mucous membranes. EOMI  Neck: No lymphadenopathy.  Supple.  Cardiovascular: RRR  Respiratory: Clear to auscultation bilaterally.  No wheezes. No rhonchi.  Abdomen: Soft, nontender, nondistended.   Musculoskeletal: No edema noted  Integument: No lesions. No erythema.    Laboratory Data:  Recent Labs   Lab 02/16/25  0526   RBC 4.01   HGB 11.4*   HCT 36.0*   MCV 89.8   MCH 28.4   MCHC 31.7   RDW 16.3*   NEPRELIM 10.81*   WBC 11.6*   .0     Recent Labs   Lab 02/15/25  2104   GLU 94   BUN 22   CREATSERUM 1.17   CA 8.2*   ALB 3.2      K 4.2      CO2 23.0   ALKPHO 98   AST 31   ALT 11   BILT 0.4   TP 6.4       Microbiology: Reviewed in EMR    Radiology: Reviewed      MD [facility]  [phone]  2/16/2025         [1]   Allergies  Allergen Reactions    Adhesive Tape RASH

## 2025-02-16 NOTE — CM/SW NOTE
Per chart, PT/OT worked w/ pt and Anticipated therapy need: Gradual Rehabilitative Therapy    YASMIN referrals sent in [facility] for review. PASRR completed/uploaded. CLRC sent.    Per chart, pt may be from [facility] - Monday SW/CM team will need to f/up and confirm.     PLAN: Poss YASMIN - pending confirm at [facility] vs YASMIN list/choice & med clear      SW/CM to remain available for support and/or discharge planning.         Cony MSW, LSW [phone]

## 2025-02-16 NOTE — PHYSICAL THERAPY NOTE
PHYSICAL THERAPY EVALUATION - INPATIENT     Room Number: 507/507-A  Evaluation Date: 2/16/2025  Type of Evaluation: Initial   Physician Order: PT Eval and Treat    Presenting Problem: COVID-19  Co-Morbidities : HTN, DM, legally blind  Reason for Therapy: Mobility Dysfunction and Discharge Planning    PHYSICAL THERAPY ASSESSMENT   Patient is a 89 year old male admitted 2/15/2025 for increased O2 needs, COVID positive.  Prior to admission, patient's baseline is typically stand-pivot transfers with 1 person assist from bed<>chair.  Patient is currently functioning below baseline with bed mobility and maintaining seated position.  Patient is currently unable to achieve stand or tranfers. Patient is requiring moderate assist and maximum assist as a result of the following impairments: decreased functional strength, decreased endurance/aerobic capacity, pain, impaired sitting balance, decreased muscular endurance, medical status, and increased O2 needs from baseline.  Physical therapy will continue to follow for duration of hospitalization.    Patient will benefit from continued skilled PT Services to promote return to prior level of function and safety with continuous assistance and gradual rehabilitative therapy .    PLAN DURING HOSPITALIZATION  Nursing Mobility Recommendation : Lift Equipment  PT Device Recommendation: Mechanical lift  PT Treatment Plan: Bed mobility;Endurance;Energy conservation;Patient education;Gait training;Neuromuscular re-educate;Strengthening;Transfer training  Rehab Potential : Fair  Frequency (Obs): 3-5x/week     PHYSICAL THERAPY MEDICAL/SOCIAL HISTORY   History related to current admission: The pt presented to the ED via EMS on 2/15 after testing positive for COVID and requiring increased O2.  Pt admitted with COVID.    Problem List  Principal Problem:    COVID  Active Problems:    Hypoxemia    Nosocomial pneumonia      HOME SITUATION  Type of Home: Assisted living facility                         Lives With: Staff 24 hours    Drives: No   Patient Regularly Uses: Wheelchair     Prior Level of [place]:  The pt is typically able to complete SPT from bed<>chair with 1 person assistance.  Per family, pt with recent admission at [facility] with functional decline, requiring 2 person assist, and prompting YASMIN    SUBJECTIVE  Pt hoarse and speech is largely unintelligible.    PHYSICAL THERAPY EXAMINATION   OBJECTIVE  Precautions: Bed/chair alarm;Low vision (legally blind)  Fall Risk: High fall risk    WEIGHT BEARING RESTRICTION       PAIN ASSESSMENT  Rating:  (\"a lot\")  Location: \"my butt\"  Management Techniques: Activity promotion;Repositioning    COGNITION  Overall Cognitive Status:  WFL - within functional limits  Following Commands:  follows one step commands with increased time and follows one step commands with repetition    RANGE OF MOTION AND STRENGTH ASSESSMENT  Upper extremity ROM is WNL and strength is grossly 3+/5  Lower extremity ROM is within functional limits   Lower extremity strength is within functional limits except for the following:    Right Hip flexion  3-/5  Left Hip flexion  3-/5  Right Knee extension  3/5  Left Knee extension  3/5  Right Knee flexion  3/5  Left Knee flexion  3/5  Right Dorsiflexion  3/5  Left Dorsiflexion  3+/5    BALANCE  Static Sitting: Poor  Dynamic Sitting: Poor -  Static Standing: Not tested  Dynamic Standing: Not tested      O2 WALK  Oxygen Therapy  SPO2% on Oxygen at Rest: 88-92  At rest oxygen flow (liters per minute): 5    AM-PAC '6-Clicks' INPATIENT SHORT FORM - BASIC MOBILITY  How much difficulty does the patient currently have...  Patient Difficulty: Turning over in bed (including adjusting bedclothes, sheets and blankets)?: A Lot   Patient Difficulty: Sitting down on and standing up from a chair with arms (e.g., wheelchair, bedside commode, etc.): Unable   Patient Difficulty: Moving from lying on back to sitting on the side of the bed?: A Lot   How much  help from another person does the patient currently need...   Help from Another: Moving to and from a bed to a chair (including a wheelchair)?: Total   Help from Another: Need to walk in hospital room?: Total   Help from Another: Climbing 3-5 steps with a railing?: Total     AM-PAC Score:  Raw Score: 8   Approx Degree of Impairment: 86.62%   Standardized Score (AM-PAC Scale): 28.58   CMS Modifier (G-Code): CM    FUNCTIONAL ABILITY STATUS  Functional Mobility/Gait Assessment  Gait Assistance:  (unable to achieve)  Rolling: maximum assist  Supine to Sit: maximum assist  Sit to Supine: dependent  Sit to Stand:  unable to achieve    Exercise/Education Provided:  Bed mobility  Body mechanics  Functional activity tolerated  Neuromuscular re-educate  Posture  Strengthening    Skilled Therapy Provided: The pt was approached for therapy lying supine in bed.  Pt able to complete supine>sidelying with Max A with manual placement of hand on bedrail. Sidelying>sit to EOB with Max A.  Pt able to maintain static sit at EOB x5 minutes with Mod A.  Pt fatigued following 5 minutes and assisted back to bed with Max A.    The patient's Approx Degree of Impairment: 86.62% has been calculated based on documentation in the [facility] '6 clicks' Inpatient Basic Mobility Short Form.  Research supports that patients with this level of impairment may benefit from inpatient rehabilitation.  Final disposition will be made by interdisciplinary medical team.    Patient End of Session: In bed;Needs met;Call light within reach;RN aware of session/findings;All patient questions and concerns addressed;Hospital anti-slip socks;Alarm set    CURRENT GOALS  Goals to be met by: 2/24/25  Patient Goal Patient's self-stated goal is: to return to assisted living   Goal #1 Patient is able to demonstrate supine - sit EOB @ level: moderate assistance     Goal #1   Current Status    Goal #2 Patient is able to demonstrate transfers EOB to/from Chair/Wheelchair at  assistance level: minimum assistance with walker - rolling     Goal #2  Current Status    Goal #3 Patient is able to maintain static, unsupported sit at EOB x10 minutes at assistance level: supervision   Goal #3   Current Status    Goal #4 Patient to demonstrate independence with home activity/exercise instructions provided to patient in preparation for discharge.   Goal #4   Current Status    Goal #5    Goal #5   Current Status    Goal #6    Goal #6  Current Status      Patient Evaluation Complexity Level:  History High - 3 or more personal factors and/or co-morbidities   Examination of body systems Moderate - addressing a total of 3 or more elements   Clinical Presentation  Moderate - Evolving   Clinical Decision Making  Moderate Complexity       Therapeutic Activity:  10 minutes

## 2025-02-17 LAB
ANION GAP SERPL CALC-SCNC: 9 MMOL/L (ref 0–18)
BASOPHILS # BLD AUTO: 0.01 X10(3) UL (ref 0–0.2)
BASOPHILS NFR BLD AUTO: 0.1 %
BUN BLD-MCNC: 25 MG/DL (ref 9–23)
BUN/CREAT SERPL: 29.1 (ref 10–20)
CALCIUM BLD-MCNC: 8.4 MG/DL (ref 8.7–10.4)
CHLORIDE SERPL-SCNC: 108 MMOL/L (ref 98–112)
CO2 SERPL-SCNC: 23 MMOL/L (ref 21–32)
CREAT BLD-MCNC: 0.86 MG/DL
DEPRECATED RDW RBC AUTO: 53.3 FL (ref 35.1–46.3)
EGFRCR SERPLBLD CKD-EPI 2021: 79 ML/MIN/1.73M2 (ref 60–?)
EOSINOPHIL # BLD AUTO: 0 X10(3) UL (ref 0–0.7)
EOSINOPHIL NFR BLD AUTO: 0 %
ERYTHROCYTE [DISTWIDTH] IN BLOOD BY AUTOMATED COUNT: 16.2 % (ref 11–15)
GLUCOSE BLD-MCNC: 128 MG/DL (ref 70–99)
HCT VFR BLD AUTO: 34.4 %
HGB BLD-MCNC: 10.9 G/DL
IMM GRANULOCYTES # BLD AUTO: 0.06 X10(3) UL (ref 0–1)
IMM GRANULOCYTES NFR BLD: 0.6 %
LYMPHOCYTES # BLD AUTO: 0.87 X10(3) UL (ref 1–4)
LYMPHOCYTES NFR BLD AUTO: 8.4 %
MCH RBC QN AUTO: 28.6 PG (ref 26–34)
MCHC RBC AUTO-ENTMCNC: 31.7 G/DL (ref 31–37)
MCV RBC AUTO: 90.3 FL
MONOCYTES # BLD AUTO: 0.55 X10(3) UL (ref 0.1–1)
MONOCYTES NFR BLD AUTO: 5.3 %
NEUTROPHILS # BLD AUTO: 8.87 X10 (3) UL (ref 1.5–7.7)
NEUTROPHILS # BLD AUTO: 8.87 X10(3) UL (ref 1.5–7.7)
NEUTROPHILS NFR BLD AUTO: 85.6 %
OSMOLALITY SERPL CALC.SUM OF ELEC: 296 MOSM/KG (ref 275–295)
PLATELET # BLD AUTO: 257 10(3)UL (ref 150–450)
POTASSIUM SERPL-SCNC: 3.7 MMOL/L (ref 3.5–5.1)
RBC # BLD AUTO: 3.81 X10(6)UL
SODIUM SERPL-SCNC: 140 MMOL/L (ref 136–145)
WBC # BLD AUTO: 10.4 X10(3) UL (ref 4–11)

## 2025-02-17 PROCEDURE — 85025 COMPLETE CBC W/AUTO DIFF WBC: CPT | Performed by: INTERNAL MEDICINE

## 2025-02-17 PROCEDURE — 92526 ORAL FUNCTION THERAPY: CPT

## 2025-02-17 PROCEDURE — 80048 BASIC METABOLIC PNL TOTAL CA: CPT | Performed by: INTERNAL MEDICINE

## 2025-02-17 RX ORDER — POTASSIUM CHLORIDE 1.5 G/1.58G
40 POWDER, FOR SOLUTION ORAL ONCE
Status: COMPLETED | OUTPATIENT
Start: 2025-02-17 | End: 2025-02-17

## 2025-02-17 RX ORDER — BISACODYL 10 MG
10 SUPPOSITORY, RECTAL RECTAL
Status: DISCONTINUED | OUTPATIENT
Start: 2025-02-17 | End: 2025-02-18

## 2025-02-17 NOTE — CONGREGATE LIVING REVIEW
[facility] Living Authorization    The [facility] Review Committee has reviewed this case and the patient IS APPROVED for discharge to a facility for Short Term Skilled once the following procedure is followed:     - The physician discharge instructions (contained within the ABDULLAHI note for SNF) must inlcude the below appropriate and approved COVID instructions to the facility    For questions regarding CLRC approval process, please contact the CM assigned to the case.  For questions regarding RN discharge workflow, please contact the unit Clinical Leader.

## 2025-02-17 NOTE — PLAN OF CARE
Problem: CARDIOVASCULAR - ADULT  Goal: Maintains optimal cardiac output and hemodynamic stability  Description: INTERVENTIONS:  - Monitor vital signs, rhythm, and trends  - Monitor for bleeding, hypotension and signs of decreased cardiac output  - Evaluate effectiveness of vasoactive medications to optimize hemodynamic stability  - Monitor arterial and/or venous puncture sites for bleeding and/or hematoma  - Assess quality of pulses, skin color and temperature  - Assess for signs of decreased coronary artery perfusion - ex. Angina  - Evaluate fluid balance, assess for edema, trend weights  Outcome: Progressing     Problem: RESPIRATORY - ADULT  Goal: Achieves optimal ventilation and oxygenation  Description: INTERVENTIONS:  - Assess for changes in respiratory status  - Assess for changes in mentation and behavior  - Position to facilitate oxygenation and minimize respiratory effort  - Oxygen supplementation based on oxygen saturation or ABGs  - Provide Smoking Cessation handout, if applicable  - Encourage broncho-pulmonary hygiene including cough, deep breathe, Incentive Spirometry  - Assess the need for suctioning and perform as needed  - Assess and instruct to report SOB or any respiratory difficulty  - Respiratory Therapy support as indicated  - Manage/alleviate anxiety  - Monitor for signs/symptoms of CO2 retention  Outcome: Progressing     Problem: GASTROINTESTINAL - ADULT  Goal: Minimal or absence of nausea and vomiting  Description: INTERVENTIONS:  - Maintain adequate hydration with IV or PO as ordered and tolerated  - Nasogastric tube to low intermittent suction as ordered  - Evaluate effectiveness of ordered antiemetic medications  - Provide nonpharmacologic comfort measures as appropriate  - Advance diet as tolerated, if ordered  - Obtain nutritional consult as needed  - Evaluate fluid balance  Outcome: Progressing     Problem: GENITOURINARY - ADULT  Goal: Absence of urinary retention  Description:  INTERVENTIONS:  - Assess patient’s ability to void and empty bladder  - Monitor intake/output and perform bladder scan as needed  - Follow urinary retention protocol/standard of care  - Consider collaborating with pharmacy to review patient's medication profile  - Implement strategies to promote bladder emptying  Outcome: Progressing     Patient weaned to 4 L nasal cannula. On remote telemetry monitoring. PRN medications given per MAR. Tolerating ground, minced/moist diet with 1:1 assistance with meals.   Safety precautions in place, frequent nursing rounding completed, call light within reach. All questions answered and needs met.

## 2025-02-17 NOTE — CM/SW NOTE
02/17/25 0900   CM/SW Referral Data   Referral Source    Reason for Referral Discharge planning   Informant Daughter   Medical Hx   Does patient have an established PCP? Yes  (Tulio Cortés)   Patient Info   Patient's Current Mental Status at Time of Assessment Confused or unable to complete assessment   Patient's Home Environment Long Term Care Facility   Post Acute Care Provider Upon Admission   ([place])   Patient Status Prior to Admission   Independent with ADLs and Mobility No   Pt. requires assistance with Housework;Driving;Meals;Bathing;Ambulating;Dressing;Medications;Toileting;Finances   Services in place prior to admission DME/Supplies at home   Type of DME/Supplies Straight Cane;Wheelchair   Discharge Needs   Anticipated D/C needs Subacute rehab   Services Requested   Submitted to [facility] Yes   PASRR Level 1 Submitted No - Current within 90 days     Pt discussed during nursing rounds. Dx COVID-19 on 1L (RA baseline). [facility], pt discharge to facility from [facility] per daughter Vera. Pt lives at [facility] but was transferred to [facility] for YASMIN once his needs could no longer be managed at [facility] Vera declined return to [facility] and chose [facility] once discharged from [facility].  Vera notified  that plan was return to [place] after [facility]MIN stay a [facility]. Pt is independent with walker at baseline. Weekend SW sent YASMIN referrals in [facility] and requested [facility] review but was not aware of the above.  [facility] PT/OT evals pending at this time.    1305: [facility] approved return to [facility] [place].    Plan: [facility] pending evals and medical clearance.    / to remain available for support and/or discharge planning.     DEMOND Marcial    [phone]

## 2025-02-17 NOTE — PROGRESS NOTES
[facility]  part of [facility]    Progress Note    Louis Solitario Patient Status:  Inpatient    1/10/1929 MRN [id number]   Location [facility]5W Attending Crystal Hewitt MD   Hosp Day # 1 PCP NICK PAZ       SUBJECTIVE:  Patient denies any shortness of breath.  Complains of constipation.  Daughters are at the bedside.      OBJECTIVE:  Vital signs in last 24 hours:  /85 (BP Location: Right arm)   Pulse 83   Temp 97.7 °F (36.5 °C) (Oral)   Resp 18   Ht 6' (1.829 m)   Wt 156 lb 9.6 oz (71 kg)   SpO2 93%   BMI 21.24 kg/m²     Intake/Output:    Intake/Output Summary (Last 24 hours) at 2025 1036  Last data filed at 2025 0931  Gross per 24 hour   Intake 222 ml   Output 580 ml   Net -358 ml       Wt Readings from Last 3 Encounters:   25 156 lb 9.6 oz (71 kg)   23 165 lb (74.8 kg)   23 168 lb 4.8 oz (76.3 kg)       Exam  Gen: No acute distress, alert and oriented x3,  HEENT: pallor noted   Pulm: Lungs clear, normal respiratory effort  CV: Heart with regular rate and rhythm, no peripheral edema  Abd: Abdomen soft, nontender, nondistended, no organomegaly, bowel sounds present  MSK: Full range of motion in extremities, no clubbing, no cyanosis  Skin: no rashes or lesions  CNS:  alert    Data Review:     Labs:   Lab Results   Component Value Date    WBC 10.4 2025    HGB 10.9 2025    HCT 34.4 2025    .0 2025    CREATSERUM 0.86 2025    BUN 25 2025     2025    K 3.7 2025     2025    CO2 23.0 2025     2025    CA 8.4 2025       LABS  Recent Labs   Lab 02/15/25  2104 25  0526 25  0542   RBC 4.08 4.01 3.81   HGB 11.4* 11.4* 10.9*   HCT 35.7* 36.0* 34.4*   MCV 87.5 89.8 90.3   MCH 27.9 28.4 28.6   MCHC 31.9 31.7 31.7   RDW 16.4* 16.3* 16.2*   NEPRELIM 10.27* 10.81* 8.87*   WBC 12.8* 11.6* 10.4   .0 254.0 257.0   AST 31  --   --    ALT  11  --   --    GLU 94  --  128*       Imaging:    Meds:   Current Facility-Administered Medications   Medication Dose Route Frequency    acetaminophen (Tylenol) tab 650 mg  650 mg Oral Q6H PRN    alum-mag hydroxide-simethicone (Maalox) 200-200-20 MG/5ML oral suspension 15 mL  15 mL Oral Q6H PRN    ascorbic acid (Vitamin C) tab 500 mg  500 mg Oral Daily    budesonide (Pulmicort) 0.5 MG/2ML nebulizer suspension 0.5 mg  0.5 mg Nebulization Daily PRN    glycerin-hypromellose- (Artificial Tears) 0.2-0.2-1 % ophthalmic solution 1 drop  1 drop Both Eyes QID PRN    clopidogrel (Plavix) tab 75 mg  75 mg Oral Daily    diphenhydrAMINE (Benadryl) cap/tab 25 mg  25 mg Oral Q4H PRN    ferrous sulfate DR tab 325 mg  325 mg Oral Daily    finasteride (Proscar) tab 5 mg  5 mg Oral Daily    magnesium oxide (Mag-Ox) tab 400 mg  400 mg Oral Daily    multivitamin (Tab-A-Garry/Beta Carotene) tab 1 tablet  1 tablet Oral Daily    nystatin (Mycostatin) 100,000 Units/g cream   Topical BID    pantoprazole (Protonix) DR tab 40 mg  40 mg Oral QAM AC    polyethylene glycol (PEG 3350) (Miralax) 17 g oral packet 17 g  17 g Oral BID    sennosides (Senokot) tab 17.2 mg  17.2 mg Oral BID    atorvastatin (Lipitor) tab 20 mg  20 mg Oral Nightly    terazosin (Hytrin) cap 5 mg  5 mg Oral Daily    glucose (Dex4) 15 GM/59ML oral liquid 15 g  15 g Oral Q15 Min PRN    Or    glucose (Glutose) 40% oral gel 15 g  15 g Oral Q15 Min PRN    Or    glucose-vitamin C (Dex-4) chewable tab 4 tablet  4 tablet Oral Q15 Min PRN    Or    dextrose 50% injection 50 mL  50 mL Intravenous Q15 Min PRN    Or    glucose (Dex4) 15 GM/59ML oral liquid 30 g  30 g Oral Q15 Min PRN    Or    glucose (Glutose) 40% oral gel 30 g  30 g Oral Q15 Min PRN    Or    glucose-vitamin C (Dex-4) chewable tab 8 tablet  8 tablet Oral Q15 Min PRN    heparin (Porcine) 5000 UNIT/ML injection 5,000 Units  5,000 Units Subcutaneous 2 times per day    dexamethasone (Decadron) tab 6 mg  6 mg Oral Daily     remdesivir (Veklury) 100 mg in sodium chloride 0.9% 270 mL IVPB  100 mg Intravenous Q24H       Assessment  Patient Active Problem List   Diagnosis    Glenohumeral arthritis, right    Urinary tract infection without hematuria    Urinary tract infection without hematuria, site unspecified    Nausea and vomiting, unspecified vomiting type    COVID    Hypoxemia    Nosocomial pneumonia   Acute hypoxic respiratory failure.  Constipation.      Plan:   Continue current treatment.  Discussed with the daughters at the bedside.     Active Orders   LAB    Potassium     Frequency: Tomorrow AM draw     Number of Occurrences: 1 Occurrences     Order Comments: DO NOT DISCONTINUE MUST REMAIN FOR ELECTROLYTE PROTOCOL       Nourishments    Room Service Eligibility Until Discontinued     Frequency: Until Discontinued     Number of Occurrences: Until Specified    Room Service Notify RN Until Discontinued     Frequency: Until Discontinued     Number of Occurrences: Until Specified   Diet    Cardiac diet Cardiac; Fluid Consistency: Thin Liquids ; Texture Consistency: Ground / Minced & Moist; Is Patient on Accuchecks? No     Frequency: Effective Now     Number of Occurrences: Until Specified     Order Comments: Upright, 1:1 supervision, slow rate, small amounts, alternate     Nursing    Ambulate patient TID     Frequency: TID     Number of Occurrences: Until Specified     Order Comments: In osuna, with assistive device      Cardiac monitoring     Frequency: Until Discontinued     Number of Occurrences: Until Specified    Cardiac monitoring     Frequency: Until Discontinued     Number of Occurrences: Until Specified    Initiate electrolyte protocol     Frequency: Until Discontinued     Number of Occurrences: Until Specified    Initiate Oxygen Protocol     Frequency: Until Discontinued     Number of Occurrences: Until Specified    Insert Peripheral IV     Frequency: Once     Number of Occurrences: 1 Occurrences    Insert/Maintain PIV      Frequency: Once     Number of Occurrences: 1 Occurrences    Intake and Output     Frequency: Q Shift     Number of Occurrences: Until Specified    Intermittent Straight Cath Protocol for Acute Urinary Retention     Frequency: Until Discontinued     Number of Occurrences: Until Specified    Place sequential compression device     Frequency: Continuous     Number of Occurrences: Until Specified     Order Comments: except while ambulating.      Please communicate patient's home diabetic medications when speaking with physician     Frequency: Once     Number of Occurrences: 1 Occurrences    Provide diabetes patient education guide     Frequency: Once     Number of Occurrences: 1 Occurrences     Order Comments: Initiate education for new onset diabetes or pre-existing diabetes with knowledge deficits.      Teach blood glucose monitoring with bedside glucometer     Frequency: Once     Number of Occurrences: 1 Occurrences     Order Comments: If patient does not have a home glucometer, notify physician to order for discharge home.      Up in chair TID     Frequency: TID     Number of Occurrences: Until Specified     Order Comments: With meals      Vital signs     Frequency: Per Unit Routine     Number of Occurrences: 2 Hours     Order Comments: ED holding order only  Cancel if other admission orders exist!        Vital signs     Frequency: Per Unit Routine     Number of Occurrences: Until Specified   Code Status    DNAR/Selective treatment Continuous     Frequency: Continuous     Number of Occurrences: Until Specified     Order Comments: Primary goal of treating medical conditions with selected measures. Relieve pain & suffering through the use of medication by any route as needed; use oxygen, suctioning & manual treatment of airway obstruction. Use medical treatment, IV fluids & IV medications (may include antibiotics and vasopressors), as medically appropriate and consistent with patient preference. Do Not Intubate.  Consider less invasive airway support (e.g. CPAP, BiPAP).         Consult    Consult to Infectious Disease     Frequency: Once     Number of Occurrences: 1 Occurrences    ED Consult to Primary Care/Medicine     Frequency: Once     Number of Occurrences: 1 Occurrences    ED Consult to Primary Care/Medicine     Frequency: Once     Number of Occurrences: 1 Occurrences   Isolation    Contact and droplet isolation status Continuous     Frequency: Continuous     Number of Occurrences: 2 Hours     Order Comments: ED holding order only  Cancel if other admission orders exist!     Medications    acetaminophen (Tylenol) tab 650 mg     Frequency: Q6H PRN     Dose: 650 mg     Route: Oral    alum-mag hydroxide-simethicone (Maalox) 200-200-20 MG/5ML oral suspension 15 mL     Frequency: Q6H PRN     Dose: 15 mL     Route: Oral    ascorbic acid (Vitamin C) tab 500 mg     Frequency: Daily     Dose: 500 mg     Route: Oral    atorvastatin (Lipitor) tab 20 mg     Frequency: Nightly     Dose: 20 mg     Route: Oral    budesonide (Pulmicort) 0.5 MG/2ML nebulizer suspension 0.5 mg     Frequency: Daily PRN     Dose: 0.5 mg     Route: Nebulization    clopidogrel (Plavix) tab 75 mg     Frequency: Daily     Dose: 75 mg     Route: Oral    dexamethasone (Decadron) tab 6 mg     Frequency: Daily     Dose: 6 mg     Route: Oral    dextrose 50% injection 50 mL     Linked Order: Or     Frequency: Q15 Min PRN     Dose: 50 mL     Route: Intravenous    diphenhydrAMINE (Benadryl) cap/tab 25 mg     Frequency: Q4H PRN     Dose: 25 mg     Route: Oral    ferrous sulfate DR tab 325 mg     Frequency: Daily     Dose: 325 mg     Route: Oral    finasteride (Proscar) tab 5 mg     Frequency: Daily     Dose: 5 mg     Route: Oral    glucose (Dex4) 15 GM/59ML oral liquid 15 g     Linked Order: Or     Frequency: Q15 Min PRN     Dose: 15 g     Route: Oral    glucose (Dex4) 15 GM/59ML oral liquid 30 g     Linked Order: Or     Frequency: Q15 Min PRN     Dose: 30 g      Route: Oral    glucose (Glutose) 40% oral gel 15 g     Linked Order: Or     Frequency: Q15 Min PRN     Dose: 15 g     Route: Oral    glucose (Glutose) 40% oral gel 30 g     Linked Order: Or     Frequency: Q15 Min PRN     Dose: 30 g     Route: Oral    glucose-vitamin C (Dex-4) chewable tab 4 tablet     Linked Order: Or     Frequency: Q15 Min PRN     Dose: 4 tablet     Route: Oral    glucose-vitamin C (Dex-4) chewable tab 8 tablet     Linked Order: Or     Frequency: Q15 Min PRN     Dose: 8 tablet     Route: Oral    glycerin-hypromellose- (Artificial Tears) 0.2-0.2-1 % ophthalmic solution 1 drop     Frequency: QID PRN     Dose: 1 drop     Route: Both Eyes    heparin (Porcine) 5000 UNIT/ML injection 5,000 Units     Frequency: Q12H NORMA     Dose: 5,000 Units     Route: Subcutaneous    magnesium oxide (Mag-Ox) tab 400 mg     Frequency: Daily     Dose: 400 mg     Route: Oral    multivitamin (Tab-A-Garry/Beta Carotene) tab 1 tablet     Frequency: Daily     Dose: 1 tablet     Route: Oral    nystatin (Mycostatin) 100,000 Units/g cream     Frequency: BID     Route: Topical    pantoprazole (Protonix) DR tab 40 mg     Frequency: QAM AC     Dose: 40 mg     Route: Oral    polyethylene glycol (PEG 3350) (Miralax) 17 g oral packet 17 g     Frequency: BID     Dose: 17 g     Route: Oral    remdesivir (Veklury) 100 mg in sodium chloride 0.9% 270 mL IVPB     Frequency: Q24H     Dose: 100 mg     Route: Intravenous    sennosides (Senokot) tab 17.2 mg     Frequency: BID     Dose: 17.2 mg     Route: Oral    terazosin (Hytrin) cap 5 mg     Frequency: Daily     Dose: 5 mg     Route: Oral       Crystal Hewitt MD  2/17/2025  10:36 AM

## 2025-02-17 NOTE — PLAN OF CARE
Problem: Impaired Cognition  Goal: Patient will exhibit improved attention, thought processing and/or memory  Description: Interventions:  - Minimize distractions in the room when full attention is required  Outcome: Progressing     Problem: SKIN/TISSUE INTEGRITY - ADULT  Goal: Skin integrity remains intact  Description: INTERVENTIONS  - Assess and document risk factors for pressure ulcer development  - Assess and document skin integrity  - Monitor for areas of redness and/or skin breakdown  - Initiate interventions, skin care algorithm/standards of care as needed  Outcome: Progressing     Problem: GENITOURINARY - ADULT  Goal: Absence of urinary retention  Description: INTERVENTIONS:  - Assess patient’s ability to void and empty bladder  - Monitor intake/output and perform bladder scan as needed  - Follow urinary retention protocol/standard of care  - Consider collaborating with pharmacy to review patient's medication profile  - Implement strategies to promote bladder emptying  Outcome: Progressing

## 2025-02-17 NOTE — SLP NOTE
SPEECH DAILY NOTE - INPATIENT    ASSESSMENT & PLAN   ASSESSMENT  Pt was seen for ongoing dysphagia intervention. Pt is on a minced and moist ground diet with thin liquids and this appears to be the baseline for the pt. RN was consulted prior to the visit.   Pt was positioned upright. Pt reported adequate tolerance to recommended diet. Pt was positioned upright and trials of puree and thin liquids were administered. Pt tolerated both consistencies with functionally adequate oral control. While no overt s/s of laryngeal penetration and aspiration were noted, pt presented with a grunt and consistent burps for each bolus trial. Respiratory abilities were functional with 1L NC. Voice was weak with hoarseness evident. Pitch was slightly higher for age with reduced respiratory drive.     It is recommended that pt continue minced and moist diet with thin liquids with strict adherence to aspiration precautions. Pt verbalized understanding, but could benefit from continued education. Plan was reviewed with RN.     Diet Recommendations - Solids: Mechanical soft ground/ Minced & Moist  Diet Recommendations - Liquids: Thin Liquids    Compensatory Strategies Recommended: Alternate consistencies;Extra sauce/gravy;Multiple swallows  Aspiration Precautions: Upright position;Slow rate;Small bites;Small sips;1:1 feeding  Medication Administration Recommendations: One pill at a time    Patient Experiencing Pain: No                Treatment Plan  Treatment Plan/Recommendations: Aspiration precautions    Interdisciplinary Communication: Discussed with RN            GOALS  Goal #1 The patient will tolerate MINCED & MOIST consistency and THIN  liquids without overt signs or symptoms of aspiration with 100 % accuracy over 1-2 session(s).    No CSA - 2/17  In Progress   Goal #2 The patient will utilize compensatory strategies as outlined by  BSSE (clinical evaluation) including Slow rate, Small bites, Small sips, Alternate liquids/solids,  Upright 90 degrees, Supervision with meals, ADDED SAUCES/GRAVIES with PRN assistance 100 % of the time across 2 sessions.    In Progress          FOLLOW UP  Follow Up Needed (Documentation Required): Yes  SLP Follow-up Date: 02/18/25  Duration: 1 week    Session: 1 since CSE    If you have any questions, please contact KARMA Luna, Ph.D., CCC-SLP  Speech-Language Pathologist  Phone number Ext.: [phone]

## 2025-02-18 VITALS
TEMPERATURE: 98 F | WEIGHT: 156.63 LBS | HEART RATE: 81 BPM | DIASTOLIC BLOOD PRESSURE: 61 MMHG | BODY MASS INDEX: 21.22 KG/M2 | RESPIRATION RATE: 18 BRPM | HEIGHT: 72 IN | OXYGEN SATURATION: 95 % | SYSTOLIC BLOOD PRESSURE: 137 MMHG

## 2025-02-18 LAB
ANION GAP SERPL CALC-SCNC: 5 MMOL/L (ref 0–18)
BASOPHILS # BLD AUTO: 0.01 X10(3) UL (ref 0–0.2)
BASOPHILS NFR BLD AUTO: 0.1 %
BUN BLD-MCNC: 20 MG/DL (ref 9–23)
BUN/CREAT SERPL: 26.3 (ref 10–20)
CALCIUM BLD-MCNC: 8.4 MG/DL (ref 8.7–10.4)
CHLORIDE SERPL-SCNC: 109 MMOL/L (ref 98–112)
CO2 SERPL-SCNC: 24 MMOL/L (ref 21–32)
CREAT BLD-MCNC: 0.76 MG/DL
DEPRECATED RDW RBC AUTO: 53.2 FL (ref 35.1–46.3)
EGFRCR SERPLBLD CKD-EPI 2021: 82 ML/MIN/1.73M2 (ref 60–?)
EOSINOPHIL # BLD AUTO: 0.04 X10(3) UL (ref 0–0.7)
EOSINOPHIL NFR BLD AUTO: 0.5 %
ERYTHROCYTE [DISTWIDTH] IN BLOOD BY AUTOMATED COUNT: 16.2 % (ref 11–15)
GLUCOSE BLD-MCNC: 105 MG/DL (ref 70–99)
HCT VFR BLD AUTO: 35.5 %
HGB BLD-MCNC: 11.2 G/DL
IMM GRANULOCYTES # BLD AUTO: 0.05 X10(3) UL (ref 0–1)
IMM GRANULOCYTES NFR BLD: 0.7 %
LYMPHOCYTES # BLD AUTO: 1.12 X10(3) UL (ref 1–4)
LYMPHOCYTES NFR BLD AUTO: 14.7 %
MCH RBC QN AUTO: 28.4 PG (ref 26–34)
MCHC RBC AUTO-ENTMCNC: 31.5 G/DL (ref 31–37)
MCV RBC AUTO: 90.1 FL
MONOCYTES # BLD AUTO: 0.53 X10(3) UL (ref 0.1–1)
MONOCYTES NFR BLD AUTO: 7 %
NEUTROPHILS # BLD AUTO: 5.87 X10 (3) UL (ref 1.5–7.7)
NEUTROPHILS # BLD AUTO: 5.87 X10(3) UL (ref 1.5–7.7)
NEUTROPHILS NFR BLD AUTO: 77 %
OSMOLALITY SERPL CALC.SUM OF ELEC: 289 MOSM/KG (ref 275–295)
PLATELET # BLD AUTO: 284 10(3)UL (ref 150–450)
POTASSIUM SERPL-SCNC: 4.3 MMOL/L (ref 3.5–5.1)
POTASSIUM SERPL-SCNC: 4.3 MMOL/L (ref 3.5–5.1)
RBC # BLD AUTO: 3.94 X10(6)UL
SODIUM SERPL-SCNC: 138 MMOL/L (ref 136–145)
WBC # BLD AUTO: 7.6 X10(3) UL (ref 4–11)

## 2025-02-18 PROCEDURE — 84132 ASSAY OF SERUM POTASSIUM: CPT | Performed by: INTERNAL MEDICINE

## 2025-02-18 PROCEDURE — 80048 BASIC METABOLIC PNL TOTAL CA: CPT | Performed by: INTERNAL MEDICINE

## 2025-02-18 PROCEDURE — 85025 COMPLETE CBC W/AUTO DIFF WBC: CPT | Performed by: INTERNAL MEDICINE

## 2025-02-18 RX ORDER — DEXAMETHASONE 6 MG/1
6 TABLET ORAL DAILY
Qty: 5 TABLET | Refills: 0 | Status: SHIPPED | OUTPATIENT
Start: 2025-02-19 | End: 2025-02-24

## 2025-02-18 RX ORDER — HEPARIN SODIUM 5000 [USP'U]/ML
5000 INJECTION, SOLUTION INTRAVENOUS; SUBCUTANEOUS EVERY 12 HOURS SCHEDULED
Qty: 28 ML | Refills: 0 | Status: SHIPPED | OUTPATIENT
Start: 2025-02-18 | End: 2025-03-04

## 2025-02-18 NOTE — CM/SW NOTE
02/18/25 1200   Discharge disposition   Expected discharge disposition subacute   Post Acute Care Provider   ([facility])   Discharge transportation [facility]     Pt discussed during nursing rounds. Pt is stable for dc today. MD dc order entered. Pt will return to [facility] for YASMIN Yris in admissions confirmed bed is available today. Pt's daughter Berenice agreeable to transfer today. [facility] scheduled for 4pm .    Number for nurse report is [phone].    Plan: [facility] for YASMIN today.    / to remain available for support and/or discharge planning.     DEMOND Marcial    [phone]

## 2025-02-18 NOTE — PROGRESS NOTES
[facility]  part of [facility]    Progress Note    Louis Solitario Patient Status:  Inpatient    1/10/1929 MRN [id number]   Location [facility]5W Attending Crystal Hewitt MD   Hosp Day # 2 PCP NICK PAZ       SUBJECTIVE:  No complaints.  Nursing staff reports no complaints.  Patient has been cleared for discharge by infectious disease.    OBJECTIVE:  Vital signs in last 24 hours:  /87 (BP Location: Right arm)   Pulse 54   Temp 97.8 °F (36.6 °C) (Oral)   Resp 20   Ht 6' (1.829 m)   Wt 156 lb 9.6 oz (71 kg)   SpO2 95%   BMI 21.24 kg/m²     Intake/Output:    Intake/Output Summary (Last 24 hours) at 2025 0812  Last data filed at 2025 0500  Gross per 24 hour   Intake 236 ml   Output 830 ml   Net -594 ml       Wt Readings from Last 3 Encounters:   25 156 lb 9.6 oz (71 kg)   23 165 lb (74.8 kg)   23 168 lb 4.8 oz (76.3 kg)       Exam  Gen: No acute distress, alert and oriented x3,  HEENT: pallor noted   Pulm: Lungs clear, normal respiratory effort  CV: Heart with regular rate and rhythm, no peripheral edema  Abd: Abdomen soft, nontender, nondistended, no organomegaly, bowel sounds present  Skin: no rashes or lesions  CNS:  alert    Data Review:     Labs:   Lab Results   Component Value Date    WBC 7.6 2025    HGB 11.2 2025    HCT 35.5 2025    .0 2025    CREATSERUM 0.76 2025    BUN 20 2025     2025    K 4.3 2025    K 4.3 2025     2025    CO2 24.0 2025     2025    CA 8.4 2025       LABS  Recent Labs   Lab 02/15/25  2104 25  0526 25  0542 25  0447   RBC 4.08 4.01 3.81 3.94   HGB 11.4* 11.4* 10.9* 11.2*   HCT 35.7* 36.0* 34.4* 35.5*   MCV 87.5 89.8 90.3 90.1   MCH 27.9 28.4 28.6 28.4   MCHC 31.9 31.7 31.7 31.5   RDW 16.4* 16.3* 16.2* 16.2*   NEPRELIM 10.27* 10.81* 8.87* 5.87   WBC 12.8* 11.6* 10.4 7.6   .0 254.0  257.0 284.0   AST 31  --   --   --    ALT 11  --   --   --    GLU 94  --  128* 105*       Imaging:    Meds:   Current Facility-Administered Medications   Medication Dose Route Frequency    bisacodyl (Dulcolax) 10 MG rectal suppository 10 mg  10 mg Rectal Daily PRN    acetaminophen (Tylenol) tab 650 mg  650 mg Oral Q6H PRN    alum-mag hydroxide-simethicone (Maalox) 200-200-20 MG/5ML oral suspension 15 mL  15 mL Oral Q6H PRN    ascorbic acid (Vitamin C) tab 500 mg  500 mg Oral Daily    budesonide (Pulmicort) 0.5 MG/2ML nebulizer suspension 0.5 mg  0.5 mg Nebulization Daily PRN    glycerin-hypromellose- (Artificial Tears) 0.2-0.2-1 % ophthalmic solution 1 drop  1 drop Both Eyes QID PRN    clopidogrel (Plavix) tab 75 mg  75 mg Oral Daily    diphenhydrAMINE (Benadryl) cap/tab 25 mg  25 mg Oral Q4H PRN    ferrous sulfate DR tab 325 mg  325 mg Oral Daily    finasteride (Proscar) tab 5 mg  5 mg Oral Daily    magnesium oxide (Mag-Ox) tab 400 mg  400 mg Oral Daily    multivitamin (Tab-A-Garry/Beta Carotene) tab 1 tablet  1 tablet Oral Daily    nystatin (Mycostatin) 100,000 Units/g cream   Topical BID    pantoprazole (Protonix) DR tab 40 mg  40 mg Oral QAM AC    polyethylene glycol (PEG 3350) (Miralax) 17 g oral packet 17 g  17 g Oral BID    sennosides (Senokot) tab 17.2 mg  17.2 mg Oral BID    atorvastatin (Lipitor) tab 20 mg  20 mg Oral Nightly    terazosin (Hytrin) cap 5 mg  5 mg Oral Daily    glucose (Dex4) 15 GM/59ML oral liquid 15 g  15 g Oral Q15 Min PRN    Or    glucose (Glutose) 40% oral gel 15 g  15 g Oral Q15 Min PRN    Or    glucose-vitamin C (Dex-4) chewable tab 4 tablet  4 tablet Oral Q15 Min PRN    Or    dextrose 50% injection 50 mL  50 mL Intravenous Q15 Min PRN    Or    glucose (Dex4) 15 GM/59ML oral liquid 30 g  30 g Oral Q15 Min PRN    Or    glucose (Glutose) 40% oral gel 30 g  30 g Oral Q15 Min PRN    Or    glucose-vitamin C (Dex-4) chewable tab 8 tablet  8 tablet Oral Q15 Min PRN    heparin (Porcine)  5000 UNIT/ML injection 5,000 Units  5,000 Units Subcutaneous 2 times per day    dexamethasone (Decadron) tab 6 mg  6 mg Oral Daily    remdesivir (Veklury) 100 mg in sodium chloride 0.9% 270 mL IVPB  100 mg Intravenous Q24H       Assessment  Patient Active Problem List   Diagnosis    Glenohumeral arthritis, right    Urinary tract infection without hematuria    Urinary tract infection without hematuria, site unspecified    Nausea and vomiting, unspecified vomiting type    COVID    Hypoxemia    Nosocomial pneumonia   Acute hypoxic respiratory failure  Continue oxygen supplementation  Patient is currently on 1 L of oxygen.    Acute COVID-19 infection.    Patient has completed remdesivir 3-day therapy.      Possible discharge today.    Discussed with the nursing staff.                Patient started on bowel regimen.    Constipation.   Better        Plan:   Continue current treatment.  Discussed  with daughter  Vera      Active Orders   Nourishments    Room Service Eligibility Until Discontinued     Frequency: Until Discontinued     Number of Occurrences: Until Specified    Room Service Notify RN Until Discontinued     Frequency: Until Discontinued     Number of Occurrences: Until Specified   Diet    Cardiac diet Cardiac; Fluid Consistency: Thin Liquids ; Texture Consistency: Ground / Minced & Moist; Is Patient on Accuchecks? No     Frequency: Effective Now     Number of Occurrences: Until Specified     Order Comments: Upright, 1:1 supervision, slow rate, small amounts, alternate     Nursing    Ambulate patient TID     Frequency: TID     Number of Occurrences: Until Specified     Order Comments: In osuna, with assistive device      Cardiac monitoring     Frequency: Until Discontinued     Number of Occurrences: Until Specified    Cardiac monitoring     Frequency: Until Discontinued     Number of Occurrences: Until Specified    Initiate electrolyte protocol     Frequency: Until Discontinued     Number of Occurrences: Until  Specified    Initiate Oxygen Protocol     Frequency: Until Discontinued     Number of Occurrences: Until Specified    Insert Peripheral IV     Frequency: Once     Number of Occurrences: 1 Occurrences    Insert/Maintain PIV     Frequency: Once     Number of Occurrences: 1 Occurrences    Intake and Output     Frequency: Q Shift     Number of Occurrences: Until Specified    Intermittent Straight Cath Protocol for Acute Urinary Retention     Frequency: Until Discontinued     Number of Occurrences: Until Specified    Place sequential compression device     Frequency: Continuous     Number of Occurrences: Until Specified     Order Comments: except while ambulating.      Please communicate patient's home diabetic medications when speaking with physician     Frequency: Once     Number of Occurrences: 1 Occurrences    Provide diabetes patient education guide     Frequency: Once     Number of Occurrences: 1 Occurrences     Order Comments: Initiate education for new onset diabetes or pre-existing diabetes with knowledge deficits.      Teach blood glucose monitoring with bedside glucometer     Frequency: Once     Number of Occurrences: 1 Occurrences     Order Comments: If patient does not have a home glucometer, notify physician to order for discharge home.      Up in chair TID     Frequency: TID     Number of Occurrences: Until Specified     Order Comments: With meals      Vital signs     Frequency: Per Unit Routine     Number of Occurrences: 2 Hours     Order Comments: ED holding order only  Cancel if other admission orders exist!        Vital signs     Frequency: Per Unit Routine     Number of Occurrences: Until Specified   Code Status    DNAR/Selective treatment Continuous     Frequency: Continuous     Number of Occurrences: Until Specified     Order Comments: Primary goal of treating medical conditions with selected measures. Relieve pain & suffering through the use of medication by any route as needed; use oxygen,  suctioning & manual treatment of airway obstruction. Use medical treatment, IV fluids & IV medications (may include antibiotics and vasopressors), as medically appropriate and consistent with patient preference. Do Not Intubate. Consider less invasive airway support (e.g. CPAP, BiPAP).         Consult    Consult to Infectious Disease     Frequency: Once     Number of Occurrences: 1 Occurrences    ED Consult to Primary Care/Medicine     Frequency: Once     Number of Occurrences: 1 Occurrences    ED Consult to Primary Care/Medicine     Frequency: Once     Number of Occurrences: 1 Occurrences   Isolation    Contact and droplet isolation status Continuous     Frequency: Continuous     Number of Occurrences: 2 Hours     Order Comments: ED holding order only  Cancel if other admission orders exist!     Medications    acetaminophen (Tylenol) tab 650 mg     Frequency: Q6H PRN     Dose: 650 mg     Route: Oral    alum-mag hydroxide-simethicone (Maalox) 200-200-20 MG/5ML oral suspension 15 mL     Frequency: Q6H PRN     Dose: 15 mL     Route: Oral    ascorbic acid (Vitamin C) tab 500 mg     Frequency: Daily     Dose: 500 mg     Route: Oral    atorvastatin (Lipitor) tab 20 mg     Frequency: Nightly     Dose: 20 mg     Route: Oral    bisacodyl (Dulcolax) 10 MG rectal suppository 10 mg     Frequency: Daily PRN     Dose: 10 mg     Route: Rectal    budesonide (Pulmicort) 0.5 MG/2ML nebulizer suspension 0.5 mg     Frequency: Daily PRN     Dose: 0.5 mg     Route: Nebulization    clopidogrel (Plavix) tab 75 mg     Frequency: Daily     Dose: 75 mg     Route: Oral    dexamethasone (Decadron) tab 6 mg     Frequency: Daily     Dose: 6 mg     Route: Oral    dextrose 50% injection 50 mL     Linked Order: Or     Frequency: Q15 Min PRN     Dose: 50 mL     Route: Intravenous    diphenhydrAMINE (Benadryl) cap/tab 25 mg     Frequency: Q4H PRN     Dose: 25 mg     Route: Oral    ferrous sulfate DR tab 325 mg     Frequency: Daily     Dose: 325 mg      Route: Oral    finasteride (Proscar) tab 5 mg     Frequency: Daily     Dose: 5 mg     Route: Oral    glucose (Dex4) 15 GM/59ML oral liquid 15 g     Linked Order: Or     Frequency: Q15 Min PRN     Dose: 15 g     Route: Oral    glucose (Dex4) 15 GM/59ML oral liquid 30 g     Linked Order: Or     Frequency: Q15 Min PRN     Dose: 30 g     Route: Oral    glucose (Glutose) 40% oral gel 15 g     Linked Order: Or     Frequency: Q15 Min PRN     Dose: 15 g     Route: Oral    glucose (Glutose) 40% oral gel 30 g     Linked Order: Or     Frequency: Q15 Min PRN     Dose: 30 g     Route: Oral    glucose-vitamin C (Dex-4) chewable tab 4 tablet     Linked Order: Or     Frequency: Q15 Min PRN     Dose: 4 tablet     Route: Oral    glucose-vitamin C (Dex-4) chewable tab 8 tablet     Linked Order: Or     Frequency: Q15 Min PRN     Dose: 8 tablet     Route: Oral    glycerin-hypromellose- (Artificial Tears) 0.2-0.2-1 % ophthalmic solution 1 drop     Frequency: QID PRN     Dose: 1 drop     Route: Both Eyes    heparin (Porcine) 5000 UNIT/ML injection 5,000 Units     Frequency: Q12H NORMA     Dose: 5,000 Units     Route: Subcutaneous    magnesium oxide (Mag-Ox) tab 400 mg     Frequency: Daily     Dose: 400 mg     Route: Oral    multivitamin (Tab-A-Garry/Beta Carotene) tab 1 tablet     Frequency: Daily     Dose: 1 tablet     Route: Oral    nystatin (Mycostatin) 100,000 Units/g cream     Frequency: BID     Route: Topical    pantoprazole (Protonix) DR tab 40 mg     Frequency: QAM AC     Dose: 40 mg     Route: Oral    polyethylene glycol (PEG 3350) (Miralax) 17 g oral packet 17 g     Frequency: BID     Dose: 17 g     Route: Oral    remdesivir (Veklury) 100 mg in sodium chloride 0.9% 270 mL IVPB     Frequency: Q24H     Dose: 100 mg     Route: Intravenous    sennosides (Senokot) tab 17.2 mg     Frequency: BID     Dose: 17.2 mg     Route: Oral    terazosin (Hytrin) cap 5 mg     Frequency: Daily     Dose: 5 mg     Route: Oral       Crystal  MD Marcelina  2/17/2025  10:36 AM

## 2025-02-18 NOTE — DISCHARGE SUMMARY
[facility]  part of [facility]    Discharge Summary    Louis Solitario Patient Status:  Inpatient    1/10/1929 MRN [id number]   Location [facility]5W Attending Crystal eHwitt MD   Hosp Day # 2 PCP NICK PAZ                Date of Admission: 2/15/2025   Date of Discharge: 2025    Admitting Diagnosis: Hypoxemia [R09.02]  Nosocomial pneumonia [J18.9, Y95]  COVID [U07.1]    Disposition: Transfer to Skilled Nursing Facility:      Discharge Diagnosis: .Principal Problem:    COVID  Active Problems:    Hypoxemia    Nosocomial pneumonia      Hospital Course:   Reason for Admission: Acute hypoxemic respiratory failure    Discharge Physical Exam:  Vital Signs:  Blood pressure 137/76, pulse 73, temperature 98.1 °F (36.7 °C), temperature source Axillary, resp. rate 18, height 6' (1.829 m), weight 156 lb 9.6 oz (71 kg), SpO2 92%.     General: No acute distress. Alert   HEENT: Moist mucous membranes. EOM-I. PERRL  Neck: No lymphadenopathy.  No JVD. No carotid bruits.  Respiratory: Clear to auscultation bilaterally.  No wheezes. No rhonchi.  Cardiovascular: S1, S2.  Regular rate and rhythm.  No murmurs. Equal pulses   Abdomen: Soft, nontender, nondistended.  Positive bowel sounds. No rebound tenderness  Neurologic: No focal neurological deficits.  Musculoskeletal: Full range of motion of all extremities.  No swelling noted.  Integument: No lesions. No erythema.  Psychiatric: Appropriate mood and affect.    Hospital Course: Louis Solitario is a(n) 89 year old male.  Osteoarthritis, benign prostatic hypertrophy, primary osteoarthritis of the knee, COPD, hyperlipidemia, ulcerative colitis, GERD, coronary artery disease, COPD, GERD, who is a resident of nearby skilled nursing facility.  Patient at the nursing facility was being treated for COVID-19 with the Paxlovid.  And the patient was having increasing oxygen requirements.  And patient was sent to the emergency room for further evaluation  and treatment.  Patient currently complains of bodyaches.  And Tylenol is not helping him.  Denies any fever or chills.  patient not a reliable historian.    Patient was admitted to hospital.  Patient was seen by infectious disease he was treated with remdesivir.  Patient overall did well.  Today the patient is doing fine.  He is doing well on 1 L of oxygen.  His vital signs are stable.  He also moved his bowels.  And the patient is stable it was decided to discharge him.  Infectious disease has cleared the patient for discharge.         Complications:     Consultants         Provider   Role Specialty     Rosalio Peña MD      Consulting Physician INFECTIOUS DISEASES     Radha Shah MD      Consulting Physician Internal Medicine     Eddi Jack      Consulting Physician Internal Medicine            Pending Labs       Order Current Status    Blood Culture Preliminary result    Blood Culture Preliminary result            Discharge Plan:   Discharge Condition: Good    Current Discharge Medication List        New Orders    Details   dexamethasone 6 MG Oral Tab Take 1 tablet (6 mg total) by mouth daily for 5 days.      heparin 5000 UNIT/ML Injection Solution Inject 1 mL (5,000 Units total) into the skin every 12 (twelve) hours for 14 days.           Home Meds - Unchanged    Details   Clopidogrel Bisulfate 75 MG Oral Tab Take 1 tablet (75 mg total) by mouth daily.      finasteride 5 MG Oral Tab Take 1 tablet (5 mg total) by mouth daily.      magnesium oxide 400 MG Oral Tab Take 420 mg by mouth daily.      omeprazole 20 MG Oral Capsule Delayed Release Take 1 capsule (20 mg total) by mouth every morning before breakfast.      simvastatin 40 MG Oral Tab Take 2 tablets (80 mg total) by mouth daily.      Terazosin HCl 5 MG Oral Cap Take 1 capsule (5 mg total) by mouth daily.      sennosides 17.2 MG Oral Tab Take 1 tablet (17.2 mg total) by mouth 2 (two) times daily.      lidocaine-menthol 4-1 % External  Patch Place 1 patch onto the skin daily.      ketoconazole 2 % External Shampoo Apply 120 mL topically twice a week.      Polyethylene Glycol 3350 17 g Oral Powd Pack Take 17 g by mouth in the morning and 17 g before bedtime.      ipratropium-albuterol 0.5-2.5 (3) MG/3ML Inhalation Solution Take 3 mL by nebulization 2 (two) times daily as needed.      budesonide (PULMICORT) 0.5 MG/2ML Inhalation Suspension Take 2 mL (0.5 mg total) by nebulization daily as needed (shortness of breath, wheezing).      diphenhydrAMINE 25 MG Oral Cap Take 1 capsule (25 mg total) by mouth every 4 (four) hours as needed for Itching.      nystatin 100,000 Units/g External Cream Apply topically 2 (two) times daily.      tacrolimus 0.1 % External Ointment Apply topically 2 (two) times daily. Apply small amount topically BID to affected area of penis and groin      pimecrolimus 1 % External Cream Apply topically 2 (two) times daily. Apply small amount topically BID, apply thin layer to affected are in groin      ascorbic acid 500 MG Oral Tab Take 1 tablet (500 mg total) by mouth daily.      ferrous sulfate 325 (65 FE) MG Oral Tab EC Take 1 tablet (325 mg total) by mouth daily.      alum-mag hydroxide-simethicone (ANTACID) 200-200-20 MG/5ML Oral Suspension Take 15 mL by mouth every 6 (six) hours as needed for Indigestion. Every 6 hours as needed for heartburn      Multiple Vitamins-Minerals (PRESERVISION AREDS 2+MULTI VIT) Oral Cap Take 1 capsule by mouth in the morning and 1 capsule before bedtime.      Acetaminophen  MG Oral Tab CR Take 1 tablet (650 mg total) by mouth every 6 (six) hours as needed for Pain.      Carboxymethylcellulose Sodium 1 % Ophthalmic Solution Place 1 drop into both eyes 4 (four) times daily as needed. 1 drop both eyes as needed four times daily      Vitamin D3 2000 units Oral Cap Take 1 capsule (2,000 Units total) by mouth daily.      lisinopril 5 MG Oral Tab Take 0.5 tablets (2.5 mg total) by mouth daily.       multivitamin Oral Tab Take 1 tablet by mouth daily.                 Discharge Diet: As tolerated    Discharge Activity: As tolerated    Follow up:       Follow up Labs:        Crystal Hewitt MD   2/18/2025

## 2025-02-18 NOTE — PLAN OF CARE
Nurse report called to anila at 1400      Problem: Patient Centered Care  Goal: Patient preferences are identified and integrated in the patient's plan of care  Description: Interventions:  - What would you like us to know as we care for you?   - Provide timely, complete, and accurate information to patient/family  - Incorporate patient and family knowledge, values, beliefs, and cultural backgrounds into the planning and delivery of care  - Encourage patient/family to participate in care and decision-making at the level they choose  - Honor patient and family perspectives and choices  Outcome: Adequate for Discharge     Problem: Patient/Family Goals  Goal: Patient/Family Long Term Goal  Description: Patient's Long Term Goal:     Interventions:  - See additional Care Plan goals for specific interventions  Outcome: Adequate for Discharge  Goal: Patient/Family Short Term Goal  Description: Patient's Short Term Goal:     Interventions:   - See additional Care Plan goals for specific interventions  Outcome: Adequate for Discharge     Problem: CARDIOVASCULAR - ADULT  Goal: Maintains optimal cardiac output and hemodynamic stability  Description: INTERVENTIONS:  - Monitor vital signs, rhythm, and trends  - Monitor for bleeding, hypotension and signs of decreased cardiac output  - Evaluate effectiveness of vasoactive medications to optimize hemodynamic stability  - Monitor arterial and/or venous puncture sites for bleeding and/or hematoma  - Assess quality of pulses, skin color and temperature  - Assess for signs of decreased coronary artery perfusion - ex. Angina  - Evaluate fluid balance, assess for edema, trend weights  Outcome: Adequate for Discharge  Goal: Absence of cardiac arrhythmias or at baseline  Description: INTERVENTIONS:  - Continuous cardiac monitoring, monitor vital signs, obtain 12 lead EKG if indicated  - Evaluate effectiveness of antiarrhythmic and heart rate control medications as ordered  - Initiate  emergency measures for life threatening arrhythmias  - Monitor electrolytes and administer replacement therapy as ordered  Outcome: Adequate for Discharge     Problem: RESPIRATORY - ADULT  Goal: Achieves optimal ventilation and oxygenation  Description: INTERVENTIONS:  - Assess for changes in respiratory status  - Assess for changes in mentation and behavior  - Position to facilitate oxygenation and minimize respiratory effort  - Oxygen supplementation based on oxygen saturation or ABGs  - Provide Smoking Cessation handout, if applicable  - Encourage broncho-pulmonary hygiene including cough, deep breathe, Incentive Spirometry  - Assess the need for suctioning and perform as needed  - Assess and instruct to report SOB or any respiratory difficulty  - Respiratory Therapy support as indicated  - Manage/alleviate anxiety  - Monitor for signs/symptoms of CO2 retention  Outcome: Adequate for Discharge     Problem: GASTROINTESTINAL - ADULT  Goal: Minimal or absence of nausea and vomiting  Description: INTERVENTIONS:  - Maintain adequate hydration with IV or PO as ordered and tolerated  - Nasogastric tube to low intermittent suction as ordered  - Evaluate effectiveness of ordered antiemetic medications  - Provide nonpharmacologic comfort measures as appropriate  - Advance diet as tolerated, if ordered  - Obtain nutritional consult as needed  - Evaluate fluid balance  Outcome: Adequate for Discharge     Problem: GENITOURINARY - ADULT  Goal: Absence of urinary retention  Description: INTERVENTIONS:  - Assess patient’s ability to void and empty bladder  - Monitor intake/output and perform bladder scan as needed  - Follow urinary retention protocol/standard of care  - Consider collaborating with pharmacy to review patient's medication profile  - Implement strategies to promote bladder emptying  Outcome: Adequate for Discharge     Problem: SKIN/TISSUE INTEGRITY - ADULT  Goal: Skin integrity remains intact  Description:  INTERVENTIONS  - Assess and document risk factors for pressure ulcer development  - Assess and document skin integrity  - Monitor for areas of redness and/or skin breakdown  - Initiate interventions, skin care algorithm/standards of care as needed  Outcome: Adequate for Discharge     Problem: Impaired Cognition  Goal: Patient will exhibit improved attention, thought processing and/or memory  Description: Interventions:  - Minimize distractions in the room when full attention is required  Outcome: Adequate for Discharge

## 2025-02-18 NOTE — PLAN OF CARE
Problem: Patient Centered Care  Goal: Patient preferences are identified and integrated in the patient's plan of care  Description: Interventions:  - What would you like us to know as we care for you? Discharge home  - Provide timely, complete, and accurate information to patient/family  - Incorporate patient and family knowledge, values, beliefs, and cultural backgrounds into the planning and delivery of care  - Encourage patient/family to participate in care and decision-making at the level they choose  - Honor patient and family perspectives and choices  Outcome: Progressing     Problem: Patient/Family Goals  Goal: Patient/Family Long Term Goal  Description: Patient's Long Term Goal: Discharge home    Interventions:  - antibiotics, oxygen   - See additional Care Plan goals for specific interventions  2/17/2025 1859 by Mars Hinton, RN  Outcome: Progressing  2/17/2025 1536 by Mars Hinton, RN  Outcome: Progressing     Problem: CARDIOVASCULAR - ADULT  Goal: Maintains optimal cardiac output and hemodynamic stability  Description: INTERVENTIONS:  - Monitor vital signs, rhythm, and trends  - Monitor for bleeding, hypotension and signs of decreased cardiac output  - Evaluate effectiveness of vasoactive medications to optimize hemodynamic stability  - Monitor arterial and/or venous puncture sites for bleeding and/or hematoma  - Assess quality of pulses, skin color and temperature  - Assess for signs of decreased coronary artery perfusion - ex. Angina  - Evaluate fluid balance, assess for edema, trend weights  Outcome: Progressing  Goal: Absence of cardiac arrhythmias or at baseline  Description: INTERVENTIONS:  - Continuous cardiac monitoring, monitor vital signs, obtain 12 lead EKG if indicated  - Evaluate effectiveness of antiarrhythmic and heart rate control medications as ordered  - Initiate emergency measures for life threatening arrhythmias  - Monitor electrolytes and administer replacement therapy  as ordered  Outcome: Progressing     Problem: GENITOURINARY - ADULT  Goal: Absence of urinary retention  Description: INTERVENTIONS:  - Assess patient’s ability to void and empty bladder  - Monitor intake/output and perform bladder scan as needed  - Follow urinary retention protocol/standard of care  - Consider collaborating with pharmacy to review patient's medication profile  - Implement strategies to promote bladder emptying  Outcome: Progressing     Problem: SKIN/TISSUE INTEGRITY - ADULT  Goal: Skin integrity remains intact  Description: INTERVENTIONS  - Assess and document risk factors for pressure ulcer development  - Assess and document skin integrity  - Monitor for areas of redness and/or skin breakdown  - Initiate interventions, skin care algorithm/standards of care as needed  Outcome: Progressing

## 2025-02-19 LAB
FOLATE SERPL-MCNC: 11.2 NG/ML
VIT B12 SERPL-MCNC: 331 PG/ML (ref 211–911)

## 2025-07-07 NOTE — CM/SW NOTE
02/20/23 1400   CM/SW Referral Data   Referral Source Social Work (self-referral)   Reason for Referral Discharge planning   Informant Patient   Access to Care / Medical Hx   Do you have a doctor or clinic where you usually go for medical care (including prenatal care)? Yes   Patient Info   Advanced directives? Yes   Patient's Home Environment Assisted Living   Patient Status Prior to Admission   Independent with ADLs and Mobility Yes   Discharge Needs   Anticipated D/C needs Subacute rehab   Services Requested   PASRR Level 1 Submitted Yes   SW initiated self referral for discharge planning. SW called daughter Nanda Spivey. SW introduced self and role to daughter. Daughter provided above information. Pt is from [address], Daughter reports that pt is fairly independent with ADLS, needs assistance due to eyesight. Pt has walker, cane, and wheelchair. Pt is current with [facility] OT. Daughter was informed of therapy recs, [facility]. Daughter and pt are agreeable to recs. Daughter was verbally given [facility] choices over phone, Daughter wishes for  or [address] [place] [address] Dr. PASRR completed. 238pm- SW was informed that [facility] has a bed avail however, pt must be agreeable to semi private room and covid booster. Daughter was called and made aware. Daughter asked for  to reach out to [address] [place] [address] Dr. [facility] was messaged via aidin, and they informed that they do try their best.  asked for [address] [place] [address] Dr for bed avail for today. Awaiting response. Daughter will get back to  shortly. 311pm-  received information that [address] [place] [address] Dr has been avail today. Daughter made aware. Daughter wishes to go with OB.due to [address] [place] [address] Dr having private room. RN, MD made aware of daughter's choice, and asked for DC order if pt is medically stable. Report number is , Rapid covid test needed. PCS form completed. [facility] arranged for pickup 02/20- 630pm. PCS form completed in Epic, RN to print with AVS. Patient/family notified transport is not covered by insurance.  Pt/family are At Neto's appointment last week we did a point-of-care urinalysis, ordered a formal clinic collect urinalysis to be sent.  I do not see the results, assume it did not get run by [facility]?   agreeable to the charges. Plan:  DC to [facility] [place], PASRR Completed. PCS form completed. [facility] scheduled for 630pm    SW/CM to remain available for support and/or discharge planning.      Patricia Gutierrez MSW, LSW   x [phone]